# Patient Record
Sex: MALE | Race: WHITE | Employment: OTHER | URBAN - METROPOLITAN AREA
[De-identification: names, ages, dates, MRNs, and addresses within clinical notes are randomized per-mention and may not be internally consistent; named-entity substitution may affect disease eponyms.]

---

## 2017-01-04 ENCOUNTER — OFFICE VISIT (OUTPATIENT)
Dept: INTERNAL MEDICINE CLINIC | Age: 52
End: 2017-01-04

## 2017-01-04 VITALS
WEIGHT: 212 LBS | OXYGEN SATURATION: 98 % | DIASTOLIC BLOOD PRESSURE: 82 MMHG | TEMPERATURE: 98.1 F | SYSTOLIC BLOOD PRESSURE: 140 MMHG | BODY MASS INDEX: 28.71 KG/M2 | HEIGHT: 72 IN | HEART RATE: 78 BPM

## 2017-01-04 DIAGNOSIS — L89.512: ICD-10-CM

## 2017-01-04 DIAGNOSIS — F33.2 SEVERE EPISODE OF RECURRENT MAJOR DEPRESSIVE DISORDER, WITHOUT PSYCHOTIC FEATURES (HCC): Primary | ICD-10-CM

## 2017-01-04 DIAGNOSIS — J06.9 VIRAL URI: ICD-10-CM

## 2017-01-04 PROCEDURE — 99213 OFFICE O/P EST LOW 20 MIN: CPT | Performed by: INTERNAL MEDICINE

## 2017-01-04 RX ORDER — SULFAMETHOXAZOLE AND TRIMETHOPRIM 800; 160 MG/1; MG/1
TABLET ORAL
Qty: 20 TABLET | Refills: 0 | Status: SHIPPED | OUTPATIENT
Start: 2017-01-04 | End: 2017-02-14 | Stop reason: ALTCHOICE

## 2017-01-04 RX ORDER — ALPRAZOLAM 0.25 MG/1
0.25 TABLET ORAL 3 TIMES DAILY PRN
Qty: 10 TABLET | Refills: 0 | Status: SHIPPED | OUTPATIENT
Start: 2017-01-04 | End: 2017-02-03

## 2017-01-04 RX ORDER — VENLAFAXINE HYDROCHLORIDE 37.5 MG/1
37.5 CAPSULE, EXTENDED RELEASE ORAL DAILY
Qty: 30 CAPSULE | Refills: 3 | Status: SHIPPED | OUTPATIENT
Start: 2017-01-04 | End: 2017-02-14 | Stop reason: ALTCHOICE

## 2017-01-04 ASSESSMENT — PATIENT HEALTH QUESTIONNAIRE - PHQ9
SUM OF ALL RESPONSES TO PHQ QUESTIONS 1-9: 2
2. FEELING DOWN, DEPRESSED OR HOPELESS: 1
1. LITTLE INTEREST OR PLEASURE IN DOING THINGS: 1
SUM OF ALL RESPONSES TO PHQ9 QUESTIONS 1 & 2: 2

## 2017-01-26 ENCOUNTER — TELEPHONE (OUTPATIENT)
Dept: INTERNAL MEDICINE CLINIC | Age: 52
End: 2017-01-26

## 2017-02-01 RX ORDER — HYDROCODONE BITARTRATE AND ACETAMINOPHEN 7.5; 325 MG/1; MG/1
TABLET ORAL
Qty: 30 TABLET | Refills: 0 | Status: SHIPPED | OUTPATIENT
Start: 2017-02-01 | End: 2017-07-28 | Stop reason: ALTCHOICE

## 2017-02-14 ENCOUNTER — OFFICE VISIT (OUTPATIENT)
Dept: INTERNAL MEDICINE CLINIC | Age: 52
End: 2017-02-14

## 2017-02-14 VITALS
WEIGHT: 214.8 LBS | RESPIRATION RATE: 16 BRPM | SYSTOLIC BLOOD PRESSURE: 140 MMHG | HEART RATE: 77 BPM | OXYGEN SATURATION: 98 % | BODY MASS INDEX: 29.09 KG/M2 | DIASTOLIC BLOOD PRESSURE: 70 MMHG | TEMPERATURE: 98 F | HEIGHT: 72 IN

## 2017-02-14 DIAGNOSIS — R07.89 BURNING CHEST PAIN: ICD-10-CM

## 2017-02-14 DIAGNOSIS — R07.89 BURNING CHEST PAIN: Primary | ICD-10-CM

## 2017-02-14 LAB
A/G RATIO: 1.8 (ref 1.1–2.2)
ALBUMIN SERPL-MCNC: 4.6 G/DL (ref 3.4–5)
ALP BLD-CCNC: 94 U/L (ref 40–129)
ALT SERPL-CCNC: 19 U/L (ref 10–40)
ANION GAP SERPL CALCULATED.3IONS-SCNC: 16 MMOL/L (ref 3–16)
AST SERPL-CCNC: 18 U/L (ref 15–37)
BILIRUB SERPL-MCNC: 1.2 MG/DL (ref 0–1)
BUN BLDV-MCNC: 10 MG/DL (ref 7–20)
CALCIUM SERPL-MCNC: 10.1 MG/DL (ref 8.3–10.6)
CHLORIDE BLD-SCNC: 98 MMOL/L (ref 99–110)
CO2: 24 MMOL/L (ref 21–32)
CREAT SERPL-MCNC: 0.6 MG/DL (ref 0.9–1.3)
GFR AFRICAN AMERICAN: >60
GFR NON-AFRICAN AMERICAN: >60
GLOBULIN: 2.6 G/DL
GLUCOSE BLD-MCNC: 221 MG/DL (ref 70–99)
POTASSIUM SERPL-SCNC: 4.7 MMOL/L (ref 3.5–5.1)
SODIUM BLD-SCNC: 138 MMOL/L (ref 136–145)
TOTAL PROTEIN: 7.2 G/DL (ref 6.4–8.2)

## 2017-02-14 PROCEDURE — 99213 OFFICE O/P EST LOW 20 MIN: CPT | Performed by: NURSE PRACTITIONER

## 2017-02-14 PROCEDURE — 93000 ELECTROCARDIOGRAM COMPLETE: CPT | Performed by: NURSE PRACTITIONER

## 2017-02-14 RX ORDER — GABAPENTIN 100 MG/1
CAPSULE ORAL
Qty: 90 CAPSULE | Refills: 0 | Status: SHIPPED | OUTPATIENT
Start: 2017-02-14 | End: 2017-12-18

## 2017-02-14 RX ORDER — GABAPENTIN 100 MG/1
100 CAPSULE ORAL NIGHTLY
Qty: 30 CAPSULE | Refills: 0 | Status: SHIPPED | OUTPATIENT
Start: 2017-02-14 | End: 2017-02-14 | Stop reason: SDUPTHER

## 2017-02-14 ASSESSMENT — ENCOUNTER SYMPTOMS
ABDOMINAL PAIN: 0
SHORTNESS OF BREATH: 0
CHEST TIGHTNESS: 0

## 2017-02-27 ENCOUNTER — OFFICE VISIT (OUTPATIENT)
Dept: INTERNAL MEDICINE CLINIC | Age: 52
End: 2017-02-27

## 2017-02-27 VITALS
SYSTOLIC BLOOD PRESSURE: 142 MMHG | BODY MASS INDEX: 28.85 KG/M2 | WEIGHT: 213 LBS | DIASTOLIC BLOOD PRESSURE: 82 MMHG | OXYGEN SATURATION: 98 % | HEIGHT: 72 IN | HEART RATE: 78 BPM

## 2017-02-27 DIAGNOSIS — I10 BENIGN ESSENTIAL HTN: ICD-10-CM

## 2017-02-27 DIAGNOSIS — Z23 NEED FOR PNEUMOCOCCAL VACCINATION: ICD-10-CM

## 2017-02-27 DIAGNOSIS — F17.200 SMOKER: ICD-10-CM

## 2017-02-27 DIAGNOSIS — Z79.4 TYPE 2 DIABETES MELLITUS WITH DIABETIC POLYNEUROPATHY, WITH LONG-TERM CURRENT USE OF INSULIN (HCC): Primary | ICD-10-CM

## 2017-02-27 DIAGNOSIS — E11.9 COMPREHENSIVE DIABETIC FOOT EXAMINATION, TYPE 2 DM, ENCOUNTER FOR (HCC): ICD-10-CM

## 2017-02-27 DIAGNOSIS — E11.42 TYPE 2 DIABETES MELLITUS WITH DIABETIC POLYNEUROPATHY, WITH LONG-TERM CURRENT USE OF INSULIN (HCC): Primary | ICD-10-CM

## 2017-02-27 LAB
CREATININE URINE: 100.9 MG/DL (ref 39–259)
HBA1C MFR BLD: 8.3 %
MICROALBUMIN UR-MCNC: <1.2 MG/DL
MICROALBUMIN/CREAT UR-RTO: NORMAL MG/G (ref 0–30)

## 2017-02-27 PROCEDURE — 99214 OFFICE O/P EST MOD 30 MIN: CPT | Performed by: INTERNAL MEDICINE

## 2017-02-27 PROCEDURE — 83036 HEMOGLOBIN GLYCOSYLATED A1C: CPT | Performed by: INTERNAL MEDICINE

## 2017-02-27 RX ORDER — HYDROCODONE BITARTRATE AND ACETAMINOPHEN 5; 325 MG/1; MG/1
TABLET ORAL
Qty: 30 TABLET | Refills: 0 | Status: SHIPPED | OUTPATIENT
Start: 2017-02-27 | End: 2017-04-28 | Stop reason: SDUPTHER

## 2017-03-10 PROCEDURE — 90670 PCV13 VACCINE IM: CPT | Performed by: INTERNAL MEDICINE

## 2017-03-10 PROCEDURE — 90471 IMMUNIZATION ADMIN: CPT | Performed by: INTERNAL MEDICINE

## 2017-04-28 ENCOUNTER — TELEPHONE (OUTPATIENT)
Dept: INTERNAL MEDICINE CLINIC | Age: 52
End: 2017-04-28

## 2017-04-28 RX ORDER — HYDROCODONE BITARTRATE AND ACETAMINOPHEN 5; 325 MG/1; MG/1
TABLET ORAL
Qty: 30 TABLET | Refills: 0 | Status: SHIPPED | OUTPATIENT
Start: 2017-04-28 | End: 2017-06-21 | Stop reason: SDUPTHER

## 2017-05-09 ENCOUNTER — OFFICE VISIT (OUTPATIENT)
Dept: INTERNAL MEDICINE CLINIC | Age: 52
End: 2017-05-09

## 2017-05-09 VITALS
BODY MASS INDEX: 28.42 KG/M2 | DIASTOLIC BLOOD PRESSURE: 72 MMHG | OXYGEN SATURATION: 97 % | HEART RATE: 77 BPM | SYSTOLIC BLOOD PRESSURE: 128 MMHG | HEIGHT: 72 IN | TEMPERATURE: 98.5 F | WEIGHT: 209.8 LBS

## 2017-05-09 DIAGNOSIS — E11.9 TYPE 2 DIABETES MELLITUS WITHOUT COMPLICATION, WITHOUT LONG-TERM CURRENT USE OF INSULIN (HCC): Primary | ICD-10-CM

## 2017-05-09 DIAGNOSIS — J01.00 ACUTE NON-RECURRENT MAXILLARY SINUSITIS: ICD-10-CM

## 2017-05-09 LAB — HBA1C MFR BLD: 7.8 %

## 2017-05-09 PROCEDURE — 83036 HEMOGLOBIN GLYCOSYLATED A1C: CPT | Performed by: INTERNAL MEDICINE

## 2017-05-09 PROCEDURE — 99213 OFFICE O/P EST LOW 20 MIN: CPT | Performed by: INTERNAL MEDICINE

## 2017-05-09 RX ORDER — AZITHROMYCIN 250 MG/1
TABLET, FILM COATED ORAL
Qty: 1 PACKET | Refills: 0 | Status: SHIPPED | OUTPATIENT
Start: 2017-05-09 | End: 2017-05-19

## 2017-05-09 RX ORDER — ATORVASTATIN CALCIUM 20 MG/1
TABLET, FILM COATED ORAL
Qty: 90 TABLET | Refills: 2 | Status: SHIPPED | OUTPATIENT
Start: 2017-05-09 | End: 2017-10-17 | Stop reason: SDUPTHER

## 2017-05-09 ASSESSMENT — ENCOUNTER SYMPTOMS
SINUS PRESSURE: 1
COUGH: 1

## 2017-06-21 RX ORDER — HYDROCODONE BITARTRATE AND ACETAMINOPHEN 5; 325 MG/1; MG/1
TABLET ORAL
Qty: 30 TABLET | Refills: 0 | Status: SHIPPED | OUTPATIENT
Start: 2017-06-21 | End: 2017-08-09 | Stop reason: SDUPTHER

## 2017-07-27 ENCOUNTER — TELEPHONE (OUTPATIENT)
Dept: INTERNAL MEDICINE CLINIC | Age: 52
End: 2017-07-27

## 2017-07-28 ENCOUNTER — TELEPHONE (OUTPATIENT)
Dept: INTERNAL MEDICINE CLINIC | Age: 52
End: 2017-07-28

## 2017-07-28 ENCOUNTER — OFFICE VISIT (OUTPATIENT)
Dept: INTERNAL MEDICINE CLINIC | Age: 52
End: 2017-07-28

## 2017-07-28 VITALS
WEIGHT: 205 LBS | BODY MASS INDEX: 27.77 KG/M2 | TEMPERATURE: 97.8 F | HEIGHT: 72 IN | OXYGEN SATURATION: 99 % | RESPIRATION RATE: 20 BRPM | HEART RATE: 70 BPM | DIASTOLIC BLOOD PRESSURE: 66 MMHG | SYSTOLIC BLOOD PRESSURE: 128 MMHG

## 2017-07-28 DIAGNOSIS — Z01.818 PRE-OP TESTING: ICD-10-CM

## 2017-07-28 DIAGNOSIS — Z01.818 PRE-OP TESTING: Primary | ICD-10-CM

## 2017-07-28 LAB
ALBUMIN SERPL-MCNC: 4.7 G/DL (ref 3.4–5)
ANION GAP SERPL CALCULATED.3IONS-SCNC: 15 MMOL/L (ref 3–16)
BUN BLDV-MCNC: 12 MG/DL (ref 7–20)
CALCIUM SERPL-MCNC: 10.3 MG/DL (ref 8.3–10.6)
CHLORIDE BLD-SCNC: 100 MMOL/L (ref 99–110)
CO2: 24 MMOL/L (ref 21–32)
CREAT SERPL-MCNC: 0.7 MG/DL (ref 0.9–1.3)
GFR AFRICAN AMERICAN: >60
GFR NON-AFRICAN AMERICAN: >60
GLUCOSE BLD-MCNC: 224 MG/DL (ref 70–99)
HCT VFR BLD CALC: 45.4 % (ref 40.5–52.5)
HEMOGLOBIN: 15.8 G/DL (ref 13.5–17.5)
MCH RBC QN AUTO: 31.2 PG (ref 26–34)
MCHC RBC AUTO-ENTMCNC: 34.8 G/DL (ref 31–36)
MCV RBC AUTO: 89.7 FL (ref 80–100)
PDW BLD-RTO: 13.2 % (ref 12.4–15.4)
PHOSPHORUS: 3 MG/DL (ref 2.5–4.9)
PLATELET # BLD: 210 K/UL (ref 135–450)
PMV BLD AUTO: 9.1 FL (ref 5–10.5)
POTASSIUM SERPL-SCNC: 4.7 MMOL/L (ref 3.5–5.1)
RBC # BLD: 5.06 M/UL (ref 4.2–5.9)
SODIUM BLD-SCNC: 139 MMOL/L (ref 136–145)
WBC # BLD: 8.6 K/UL (ref 4–11)

## 2017-07-28 PROCEDURE — 93000 ELECTROCARDIOGRAM COMPLETE: CPT | Performed by: INTERNAL MEDICINE

## 2017-07-28 PROCEDURE — 99214 OFFICE O/P EST MOD 30 MIN: CPT | Performed by: INTERNAL MEDICINE

## 2017-07-28 ASSESSMENT — ENCOUNTER SYMPTOMS
WHEEZING: 0
SORE THROAT: 0
SHORTNESS OF BREATH: 0
DIARRHEA: 0
NAUSEA: 0
TROUBLE SWALLOWING: 0
ABDOMINAL PAIN: 0
VOMITING: 0

## 2017-08-09 RX ORDER — HYDROCODONE BITARTRATE AND ACETAMINOPHEN 5; 325 MG/1; MG/1
TABLET ORAL
Qty: 30 TABLET | Refills: 0 | Status: SHIPPED | OUTPATIENT
Start: 2017-08-09 | End: 2017-09-07 | Stop reason: SDUPTHER

## 2017-08-18 ENCOUNTER — TELEPHONE (OUTPATIENT)
Dept: INTERNAL MEDICINE CLINIC | Age: 52
End: 2017-08-18

## 2017-08-29 ENCOUNTER — OFFICE VISIT (OUTPATIENT)
Dept: INTERNAL MEDICINE CLINIC | Age: 52
End: 2017-08-29

## 2017-08-29 ENCOUNTER — TELEPHONE (OUTPATIENT)
Dept: INTERNAL MEDICINE CLINIC | Age: 52
End: 2017-08-29

## 2017-08-29 VITALS
DIASTOLIC BLOOD PRESSURE: 72 MMHG | OXYGEN SATURATION: 98 % | HEART RATE: 66 BPM | WEIGHT: 198.6 LBS | BODY MASS INDEX: 26.9 KG/M2 | HEIGHT: 72 IN | TEMPERATURE: 97.5 F | SYSTOLIC BLOOD PRESSURE: 126 MMHG

## 2017-08-29 DIAGNOSIS — E11.9 TYPE 2 DIABETES MELLITUS WITHOUT COMPLICATION, WITH LONG-TERM CURRENT USE OF INSULIN (HCC): ICD-10-CM

## 2017-08-29 DIAGNOSIS — F17.200 SMOKER: ICD-10-CM

## 2017-08-29 DIAGNOSIS — R63.4 WEIGHT LOSS: ICD-10-CM

## 2017-08-29 DIAGNOSIS — Z79.4 TYPE 2 DIABETES MELLITUS WITHOUT COMPLICATION, WITH LONG-TERM CURRENT USE OF INSULIN (HCC): ICD-10-CM

## 2017-08-29 DIAGNOSIS — R31.0 GROSS HEMATURIA: Primary | ICD-10-CM

## 2017-08-29 LAB
GLUCOSE BLD-MCNC: 207 MG/DL
HBA1C MFR BLD: 7.5 %

## 2017-08-29 PROCEDURE — 82962 GLUCOSE BLOOD TEST: CPT | Performed by: INTERNAL MEDICINE

## 2017-08-29 PROCEDURE — 83036 HEMOGLOBIN GLYCOSYLATED A1C: CPT | Performed by: INTERNAL MEDICINE

## 2017-08-29 PROCEDURE — 99214 OFFICE O/P EST MOD 30 MIN: CPT | Performed by: INTERNAL MEDICINE

## 2017-08-29 RX ORDER — SULFAMETHOXAZOLE AND TRIMETHOPRIM 800; 160 MG/1; MG/1
1 TABLET ORAL 2 TIMES DAILY
Qty: 14 TABLET | Refills: 0 | Status: SHIPPED | OUTPATIENT
Start: 2017-08-29 | End: 2017-09-05

## 2017-08-31 LAB — URINE CULTURE, ROUTINE: NORMAL

## 2017-09-01 ENCOUNTER — TELEPHONE (OUTPATIENT)
Dept: INTERNAL MEDICINE CLINIC | Age: 52
End: 2017-09-01

## 2017-09-01 ENCOUNTER — HOSPITAL ENCOUNTER (OUTPATIENT)
Dept: CT IMAGING | Age: 52
Discharge: HOME | End: 2017-09-01
Attending: INTERNAL MEDICINE | Admitting: INTERNAL MEDICINE

## 2017-09-01 DIAGNOSIS — R31.0 GROSS HEMATURIA: ICD-10-CM

## 2017-09-01 DIAGNOSIS — R31.9 HEMATURIA: ICD-10-CM

## 2017-09-01 DIAGNOSIS — R31.9 HEMATURIA: Primary | ICD-10-CM

## 2017-09-08 RX ORDER — HYDROCODONE BITARTRATE AND ACETAMINOPHEN 5; 325 MG/1; MG/1
TABLET ORAL
Qty: 30 TABLET | Refills: 0 | Status: SHIPPED | OUTPATIENT
Start: 2017-09-08 | End: 2017-11-22 | Stop reason: SDUPTHER

## 2017-10-05 ENCOUNTER — HOSPITAL ENCOUNTER (OUTPATIENT)
Dept: OTHER | Age: 52
Discharge: HOME | End: 2017-10-05
Attending: UROLOGY | Admitting: UROLOGY

## 2017-10-05 DIAGNOSIS — N20.0 URIC ACID NEPHROLITHIASIS: ICD-10-CM

## 2017-10-17 ENCOUNTER — OFFICE VISIT (OUTPATIENT)
Dept: INTERNAL MEDICINE CLINIC | Age: 52
End: 2017-10-17

## 2017-10-17 VITALS
HEART RATE: 70 BPM | BODY MASS INDEX: 27.09 KG/M2 | DIASTOLIC BLOOD PRESSURE: 84 MMHG | SYSTOLIC BLOOD PRESSURE: 134 MMHG | HEIGHT: 72 IN | WEIGHT: 200 LBS | TEMPERATURE: 98 F | OXYGEN SATURATION: 98 %

## 2017-10-17 DIAGNOSIS — Z12.11 SCREEN FOR COLON CANCER: ICD-10-CM

## 2017-10-17 DIAGNOSIS — K80.20 GALLSTONES: ICD-10-CM

## 2017-10-17 DIAGNOSIS — F17.200 SMOKER: ICD-10-CM

## 2017-10-17 DIAGNOSIS — I25.10 CORONARY ARTERIOSCLEROSIS: Primary | ICD-10-CM

## 2017-10-17 PROCEDURE — 99214 OFFICE O/P EST MOD 30 MIN: CPT | Performed by: INTERNAL MEDICINE

## 2017-10-17 RX ORDER — ATORVASTATIN CALCIUM 40 MG/1
TABLET, FILM COATED ORAL
Qty: 90 TABLET | Refills: 3 | Status: SHIPPED | OUTPATIENT
Start: 2017-10-17 | End: 2018-11-08 | Stop reason: SDUPTHER

## 2017-10-17 ASSESSMENT — ENCOUNTER SYMPTOMS: NAUSEA: 1

## 2017-10-17 NOTE — PROGRESS NOTES
BP Readings from Last 2 Encounters:   10/17/17 134/84   08/29/17 126/72     Hemoglobin A1C (%)   Date Value   08/29/2017 7.5     MICROALBUMIN, RANDOM URINE (mg/dL)   Date Value   02/27/2017 <1.20     LDL Calculated (mg/dL)   Date Value   09/18/2015 60              Tobacco use:  Patient  reports that he has been smoking Cigarettes. He started smoking about 37 years ago. He has a 20.00 pack-year smoking history. He does not have any smokeless tobacco history on file. If Smoker - Cessation materials given? Yes    Is Daily aspirin on medication list?:  Yes    Diabetic retinal exam done? Yes   If yes, document in Health Maintenance. Monofilament placed on counter? Yes    Shoes and socks removed? Yes    BP taken with correct size cuff? Yes   Repeated if > 130/80 No     Microalbumin performed if applicable?   Yes
person, place, and time and well-developed, well-nourished, and in no distress. No distress. HENT:   Head: Normocephalic and atraumatic. Eyes: Conjunctivae are normal. Pupils are equal, round, and reactive to light. Neck: Normal range of motion. Neck supple. Cardiovascular: Normal rate, regular rhythm and normal heart sounds. Pulmonary/Chest: Effort normal and breath sounds normal.   Abdominal: Soft. Bowel sounds are normal. There is no tenderness. Musculoskeletal: Normal range of motion. He exhibits no edema. Neurological: He is alert and oriented to person, place, and time. Skin: Skin is warm and dry. Psychiatric: Affect and judgment normal.         Assessment/Plan     1. CAD: on ct not clearly symptomatic, multiple risk factors  Plan GXT mc  2. Gallstones:  Not clearly symptomatic  Plan Hida scan  3. DM2  Not well controlled  4.  Smoker   Needs to quit  discussed  Isis Diaz MD

## 2017-10-23 ENCOUNTER — HOSPITAL ENCOUNTER (OUTPATIENT)
Dept: NON INVASIVE DIAGNOSTICS | Age: 52
Discharge: HOME | End: 2017-10-23
Admitting: INTERNAL MEDICINE

## 2017-10-23 DIAGNOSIS — K80.20 CALCULUS OF GALLBLADDER WITHOUT CHOLECYSTITIS WITHOUT OBSTRUCTION: ICD-10-CM

## 2017-10-23 LAB
LV EF: 57 %
LVEF MODALITY: NORMAL

## 2017-10-24 ENCOUNTER — OFFICE VISIT (OUTPATIENT)
Dept: INTERNAL MEDICINE CLINIC | Age: 52
End: 2017-10-24

## 2017-10-24 ENCOUNTER — TELEPHONE (OUTPATIENT)
Dept: INTERNAL MEDICINE CLINIC | Age: 52
End: 2017-10-24

## 2017-10-24 VITALS
TEMPERATURE: 97.8 F | WEIGHT: 203.8 LBS | DIASTOLIC BLOOD PRESSURE: 80 MMHG | OXYGEN SATURATION: 98 % | SYSTOLIC BLOOD PRESSURE: 128 MMHG | HEART RATE: 73 BPM | HEIGHT: 72 IN | BODY MASS INDEX: 27.6 KG/M2

## 2017-10-24 DIAGNOSIS — E11.42 DIABETIC POLYNEUROPATHY ASSOCIATED WITH TYPE 2 DIABETES MELLITUS (HCC): ICD-10-CM

## 2017-10-24 DIAGNOSIS — Z23 NEED FOR PNEUMOCOCCAL VACCINATION: ICD-10-CM

## 2017-10-24 DIAGNOSIS — E11.9 COMPREHENSIVE DIABETIC FOOT EXAMINATION, TYPE 2 DM, ENCOUNTER FOR (HCC): ICD-10-CM

## 2017-10-24 DIAGNOSIS — S90.812A ABRASION OF LEFT FOOT, INITIAL ENCOUNTER: Primary | ICD-10-CM

## 2017-10-24 DIAGNOSIS — I25.10 CORONARY ARTERY ARTERIOSCLEROSIS: ICD-10-CM

## 2017-10-24 PROBLEM — E11.40 DIABETIC NEUROPATHY (HCC): Status: ACTIVE | Noted: 2017-01-01

## 2017-10-24 PROCEDURE — 99213 OFFICE O/P EST LOW 20 MIN: CPT | Performed by: INTERNAL MEDICINE

## 2017-10-24 PROCEDURE — 90471 IMMUNIZATION ADMIN: CPT | Performed by: INTERNAL MEDICINE

## 2017-10-24 PROCEDURE — 90732 PPSV23 VACC 2 YRS+ SUBQ/IM: CPT | Performed by: INTERNAL MEDICINE
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
## 2017-10-24 NOTE — TELEPHONE ENCOUNTER
Patient called back and he was scheduled today at 4pm with Dr. [facility]BLE[facility]. Chales Minus

## 2017-10-26 ENCOUNTER — HOSPITAL ENCOUNTER (OUTPATIENT)
Dept: NUCLEAR MEDICINE | Age: 52
Discharge: HOME | End: 2017-10-26
Attending: INTERNAL MEDICINE | Admitting: INTERNAL MEDICINE

## 2017-10-26 DIAGNOSIS — K80.20 CALCULUS OF GALLBLADDER WITHOUT CHOLECYSTITIS WITHOUT OBSTRUCTION: ICD-10-CM

## 2017-10-26 DIAGNOSIS — K80.20 GALLSTONES: ICD-10-CM

## 2017-10-26 RX ADMIN — Medication 6 MILLICURIE: at 08:45

## 2017-11-09 ENCOUNTER — OFFICE VISIT (OUTPATIENT)
Dept: SURGERY | Age: 52
End: 2017-11-09

## 2017-11-09 VITALS
HEART RATE: 84 BPM | BODY MASS INDEX: 27.5 KG/M2 | WEIGHT: 203 LBS | HEIGHT: 72 IN | DIASTOLIC BLOOD PRESSURE: 91 MMHG | SYSTOLIC BLOOD PRESSURE: 150 MMHG

## 2017-11-09 DIAGNOSIS — D12.4 ADENOMATOUS POLYP OF DESCENDING COLON: Primary | ICD-10-CM

## 2017-11-09 PROCEDURE — 99243 OFF/OP CNSLTJ NEW/EST LOW 30: CPT | Performed by: SURGERY
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
## 2017-11-22 RX ORDER — HYDROCODONE BITARTRATE AND ACETAMINOPHEN 5; 325 MG/1; MG/1
TABLET ORAL
Qty: 30 TABLET | Refills: 0 | Status: SHIPPED | OUTPATIENT
Start: 2017-11-22 | End: 2018-02-02 | Stop reason: SDUPTHER

## 2017-12-05 ENCOUNTER — OFFICE VISIT (OUTPATIENT)
Dept: INTERNAL MEDICINE CLINIC | Age: 52
End: 2017-12-05

## 2017-12-05 VITALS
HEART RATE: 73 BPM | WEIGHT: 202.4 LBS | BODY MASS INDEX: 27.41 KG/M2 | TEMPERATURE: 97.9 F | SYSTOLIC BLOOD PRESSURE: 112 MMHG | HEIGHT: 72 IN | OXYGEN SATURATION: 98 % | DIASTOLIC BLOOD PRESSURE: 72 MMHG

## 2017-12-05 DIAGNOSIS — E78.00 HYPERCHOLESTEROLEMIA: ICD-10-CM

## 2017-12-05 DIAGNOSIS — E11.9 TYPE 2 DIABETES MELLITUS WITHOUT COMPLICATION, WITHOUT LONG-TERM CURRENT USE OF INSULIN (HCC): Primary | ICD-10-CM

## 2017-12-05 PROBLEM — K63.5 COLON POLYPS: Status: ACTIVE | Noted: 2017-01-01

## 2017-12-05 LAB
A/G RATIO: 1.8 (ref 1.1–2.2)
ALBUMIN SERPL-MCNC: 4.6 G/DL (ref 3.4–5)
ALP BLD-CCNC: 99 U/L (ref 40–129)
ALT SERPL-CCNC: 19 U/L (ref 10–40)
ANION GAP SERPL CALCULATED.3IONS-SCNC: 11 MMOL/L (ref 3–16)
AST SERPL-CCNC: 16 U/L (ref 15–37)
BASOPHILS ABSOLUTE: 0 K/UL (ref 0–0.2)
BASOPHILS RELATIVE PERCENT: 0.5 %
BILIRUB SERPL-MCNC: 1.6 MG/DL (ref 0–1)
BUN BLDV-MCNC: 11 MG/DL (ref 7–20)
CALCIUM SERPL-MCNC: 10 MG/DL (ref 8.3–10.6)
CHLORIDE BLD-SCNC: 99 MMOL/L (ref 99–110)
CHOLESTEROL, TOTAL: 106 MG/DL (ref 0–199)
CO2: 27 MMOL/L (ref 21–32)
CREAT SERPL-MCNC: 0.6 MG/DL (ref 0.9–1.3)
EOSINOPHILS ABSOLUTE: 0.3 K/UL (ref 0–0.6)
EOSINOPHILS RELATIVE PERCENT: 3.3 %
GFR AFRICAN AMERICAN: >60
GFR NON-AFRICAN AMERICAN: >60
GLOBULIN: 2.5 G/DL
GLUCOSE BLD-MCNC: 206 MG/DL (ref 70–99)
HCT VFR BLD CALC: 45 % (ref 40.5–52.5)
HDLC SERPL-MCNC: 50 MG/DL (ref 40–60)
HEMOGLOBIN: 15.3 G/DL (ref 13.5–17.5)
LDL CHOLESTEROL CALCULATED: 37 MG/DL
LYMPHOCYTES ABSOLUTE: 2.1 K/UL (ref 1–5.1)
LYMPHOCYTES RELATIVE PERCENT: 24.4 %
MCH RBC QN AUTO: 31.1 PG (ref 26–34)
MCHC RBC AUTO-ENTMCNC: 33.9 G/DL (ref 31–36)
MCV RBC AUTO: 91.8 FL (ref 80–100)
MONOCYTES ABSOLUTE: 0.6 K/UL (ref 0–1.3)
MONOCYTES RELATIVE PERCENT: 6.9 %
NEUTROPHILS ABSOLUTE: 5.5 K/UL (ref 1.7–7.7)
NEUTROPHILS RELATIVE PERCENT: 64.9 %
PDW BLD-RTO: 14.4 % (ref 12.4–15.4)
PLATELET # BLD: 199 K/UL (ref 135–450)
PMV BLD AUTO: 9.6 FL (ref 5–10.5)
POTASSIUM SERPL-SCNC: 4.7 MMOL/L (ref 3.5–5.1)
RBC # BLD: 4.9 M/UL (ref 4.2–5.9)
SODIUM BLD-SCNC: 137 MMOL/L (ref 136–145)
TOTAL PROTEIN: 7.1 G/DL (ref 6.4–8.2)
TRIGL SERPL-MCNC: 95 MG/DL (ref 0–150)
TSH SERPL DL<=0.05 MIU/L-ACNC: 1.26 UIU/ML (ref 0.27–4.2)
VLDLC SERPL CALC-MCNC: 19 MG/DL
WBC # BLD: 8.5 K/UL (ref 4–11)

## 2017-12-05 PROCEDURE — 99213 OFFICE O/P EST LOW 20 MIN: CPT | Performed by: INTERNAL MEDICINE

## 2017-12-05 PROCEDURE — 83036 HEMOGLOBIN GLYCOSYLATED A1C: CPT | Performed by: INTERNAL MEDICINE

## 2017-12-05 RX ORDER — HYDROCODONE BITARTRATE AND ACETAMINOPHEN 5; 325 MG/1; MG/1
TABLET ORAL
Qty: 30 TABLET | Refills: 0 | Status: CANCELLED | OUTPATIENT
Start: 2017-12-05
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
## 2017-12-05 NOTE — PROGRESS NOTES
BP Readings from Last 2 Encounters:   12/05/17 112/72   11/09/17 (!) 150/91     Hemoglobin A1C (%)   Date Value   08/29/2017 7.5     MICROALBUMIN, RANDOM URINE (mg/dL)   Date Value   02/27/2017 <1.20     LDL Calculated (mg/dL)   Date Value   09/18/2015 60              Tobacco use:  Patient  reports that he has been smoking Cigarettes. He started smoking about 37 years ago. He has a 20.00 pack-year smoking history. He has never used smokeless tobacco.    If Smoker - Cessation materials given? Yes    Is Daily aspirin on medication list?:  Yes    Diabetic retinal exam done? Yes   If yes, document in Health Maintenance. Monofilament placed on counter? Yes    Shoes and socks removed? Yes    BP taken with correct size cuff? Yes   Repeated if > 130/80 No     Microalbumin performed if applicable?   Yes

## 2017-12-15 ENCOUNTER — PAT TELEPHONE (OUTPATIENT)
Dept: PREADMISSION TESTING | Age: 52
End: 2017-12-15

## 2017-12-15 VITALS — HEIGHT: 72 IN | BODY MASS INDEX: 27.09 KG/M2 | WEIGHT: 200 LBS

## 2017-12-15 RX ORDER — AMPICILLIN TRIHYDRATE 250 MG
1 CAPSULE ORAL NIGHTLY
COMMUNITY

## 2017-12-15 ASSESSMENT — PAIN DESCRIPTION - LOCATION: LOCATION: BACK;HIP

## 2017-12-15 ASSESSMENT — PAIN DESCRIPTION - ORIENTATION: ORIENTATION: RIGHT

## 2017-12-15 ASSESSMENT — PAIN - FUNCTIONAL ASSESSMENT: PAIN_FUNCTIONAL_ASSESSMENT: 0-10

## 2017-12-15 ASSESSMENT — PAIN SCALES - GENERAL: PAINLEVEL_OUTOF10: 2

## 2017-12-15 ASSESSMENT — PAIN DESCRIPTION - DIRECTION: RADIATING_TOWARDS: LOWER BACK

## 2017-12-15 NOTE — PRE-PROCEDURE INSTRUCTIONS
[facility] time__0600__________        Surgery time__0730__________    Take the following medications with a sip of water: hydrocodone if needed    Do not eat or drink anything after 12:00 midnight prior to your surgery. This includes water chewing gum, mints and ice chips. You may brush your teeth and gargle the morning of your surgery, but do not swallow the water     Please see your family doctor/pediatrician for a history and physical and/or concerning medications. Bring any test results/reports from your physicians office. If you are under the care of a heart doctor or specialist doctor, please be aware that you may be asked to them for clearance    You may be asked to stop blood thinners such as Coumadin, Plavix, Fragmin, Lovenox, etc., or any anti-inflammatories such as:  Aspirin, Ibuprofen, Advil, Naproxen prior to your surgery. We also ask that you stop any OTC medications such as fish oil, vitamin E, glucosamine, garlic, Multivitamins, COQ 10, etc.may take tylenol-stop cinnamon,     We ask that you do not smoke 24 hours prior to surgery  We ask that you do not  drink any alcoholic beverages 24 hours prior to surgery     You must make arrangements for a responsible adult to take you home after your surgery. For your safety you will not be allowed to leave alone or drive yourself home. Your surgery will be cancelled if you do not have a ride home. Also for your safety, it is strongly suggested that someone stay with you the first 24 hours after your surgery. A parent or legal guardian must accompany a child scheduled for surgery and plan to stay at the hospital until the child is discharged. Please do not bring other children with you. For your comfort, please wear simple loose fitting clothing to the hospital.  Please do not bring valuables.     Do not wear any make-up or nail polish on your fingers or toes      For your safety, please do not wear any jewelry or body piercing's on the day of surgery. All jewelry must be removed. If you have dentures, they will be removed before going to operating room. For your convenience, we will provide you with a container. If you wear contact lenses or glasses, they will be removed, please bring a case for them. If you have a living will and a durable power of  for healthcare, please bring in a copy. As part of our patient safety program to minimize surgical site infections, we ask you to do the following:    · Please notify your surgeon if you develop any illness between         now and the  day of your surgery. · This includes a cough, cold, fever, sore throat, nausea,         or vomiting, and diarrhea, etc.  ·  Please notify your surgeon if you experience dizziness, shortness         of breath or blurred vision between now and the time of your surgery. Do not shave your operative site 96 hours prior to surgery. For face and neck surgery, men may use an electric razor 48 hours   prior to surgery. You may shower the night before surgery or the morning of   your surgery with an antibacterial soap. You will need to bring a photo ID and insurance card    [facility] has an onsite pharmacy, would you like to utilize our pharmacy     If you will be staying overnight and use a C-pap machine, please bring   your C-pap to hospital     Our goal is to provide you with excellent care, therefore, visitors will be limited to two(2) in the room at a time so that we may focus on providing this care for you. Please contact pre-admission testing if you have any further questions. [facility] phone number:  [phone] PAT fax number:  [phone]  Please note these are generalized instructions for all surgical cases, you may be provided with more specific instructions according to your surgery.

## 2017-12-15 NOTE — PRE-PROCEDURE INSTRUCTIONS
C-Difficile admission screening and protocol:     * Admitted with diarrhea?no     *Prior history of C-Diff. In last 3 months? no     *Antibiotic use in the past 6-8 weeks? no     *Prior hospitalization or nursing home in the last month?  no

## 2017-12-18 ENCOUNTER — HOSPITAL ENCOUNTER (OUTPATIENT)
Dept: ENDOSCOPY | Age: 52
Discharge: HOME | End: 2017-12-18
Attending: INTERNAL MEDICINE | Admitting: INTERNAL MEDICINE

## 2017-12-18 VITALS
HEART RATE: 61 BPM | SYSTOLIC BLOOD PRESSURE: 127 MMHG | RESPIRATION RATE: 16 BRPM | TEMPERATURE: 96.9 F | DIASTOLIC BLOOD PRESSURE: 70 MMHG | OXYGEN SATURATION: 97 %

## 2017-12-18 LAB
GLUCOSE BLD-MCNC: 100 MG/DL (ref 70–99)
GLUCOSE BLD-MCNC: 129 MG/DL (ref 70–99)
PERFORMED ON: ABNORMAL
PERFORMED ON: ABNORMAL

## 2017-12-18 RX ORDER — ONDANSETRON 2 MG/ML
4 INJECTION INTRAMUSCULAR; INTRAVENOUS
Status: ACTIVE | OUTPATIENT
Start: 2017-12-18 | End: 2017-12-18

## 2017-12-18 RX ORDER — SODIUM CHLORIDE 0.9 % (FLUSH) 0.9 %
10 SYRINGE (ML) INJECTION EVERY 12 HOURS SCHEDULED
Status: DISCONTINUED | OUTPATIENT
Start: 2017-12-18 | End: 2017-12-19 | Stop reason: HOSPADM

## 2017-12-18 RX ORDER — SODIUM CHLORIDE 0.9 % (FLUSH) 0.9 %
10 SYRINGE (ML) INJECTION PRN
Status: DISCONTINUED | OUTPATIENT
Start: 2017-12-18 | End: 2017-12-19 | Stop reason: HOSPADM

## 2017-12-18 RX ORDER — LABETALOL HYDROCHLORIDE 5 MG/ML
5 INJECTION, SOLUTION INTRAVENOUS EVERY 10 MIN PRN
Status: DISCONTINUED | OUTPATIENT
Start: 2017-12-18 | End: 2017-12-19 | Stop reason: HOSPADM

## 2017-12-18 RX ORDER — PROMETHAZINE HYDROCHLORIDE 25 MG/ML
6.25 INJECTION, SOLUTION INTRAMUSCULAR; INTRAVENOUS
Status: ACTIVE | OUTPATIENT
Start: 2017-12-18 | End: 2017-12-18

## 2017-12-18 RX ORDER — SODIUM CHLORIDE 9 MG/ML
INJECTION, SOLUTION INTRAVENOUS CONTINUOUS
Status: DISCONTINUED | OUTPATIENT
Start: 2017-12-18 | End: 2017-12-19 | Stop reason: HOSPADM

## 2017-12-18 RX ADMIN — SODIUM CHLORIDE: 9 INJECTION, SOLUTION INTRAVENOUS at 06:48

## 2017-12-18 ASSESSMENT — PAIN SCALES - GENERAL
PAINLEVEL_OUTOF10: 0
PAINLEVEL_OUTOF10: 0

## 2017-12-18 ASSESSMENT — PAIN - FUNCTIONAL ASSESSMENT: PAIN_FUNCTIONAL_ASSESSMENT: 0-10

## 2017-12-18 NOTE — OP NOTE
Colonoscopy Procedure Note      Patient: Clinton Staples  : 1965  Acct#:     Procedure: Colonoscopy with EMR    Date:  2017    Surgeon:  Emma Holloway MD    Referring Physician:  Minna Austin MD    Previous Colonoscopy: YES  Date: 10/51-[id number]-gt for removal of large polyp   Greater than 3 years: NO    Preoperative Diagnosis: 1. Follow-up for removal of large polyp    Postoperative Diagnosis:  1. Ascending Colon Polyps 2. Descending Colon Polyp-S/p EMR 3. Sigmoid Colon Polyp     Consent:  The patient or their legal guardian has signed a consent, and is aware of the potential risks, benefits, alternatives, and potential complications of this procedure. These include, but are not limited to hemorrhage, bleeding, post procedural pain, perforation, phlebitis, aspiration, hypotension, hypoxia, cardiovascular events such as arryhthmia, and possibly death. Additionally, the possibility of missed colonic polyps and interval colon cancer was discussed in the consent. Anesthesia:  The patient was administered IV propofol per anesthesiology team.  Please see their operative records for full details. Procedure: An informed consent was obtained from the patient after explanation of indications, benefits, possible risks and complications of the procedure. The patient was then taken to the endoscopy suite, placed in the left lateral decubitus position, and the above IV anesthesia was administered. A digital rectal examination was performed and revealed negative without mass, lesions or tenderness. The Olympus video colonoscope was placed in the patient's rectum under digital direction and advanced to the cecum. The cecum was identified by characteristic anatomy and ballottment. The prep was excellent. The ileocecal valve was identified. The scope was then withdrawn back through the cecum, ascending, transverse, descending, sigmoid colon, and rectum.   Careful circumferential examination of the mucosa in these areas demonstrated:    1. A 8 mm polyp in the ascending colon was removed completely with snare cautery polypectomy. Defect closed with hemoclip. 2. A 5 mm polyp in the ascending colon was removed completely with snare cautery polypectomy. 3. A 25 mm sessile polyp in the descending colon at 50 cm between the two previously placed tattoos was lifted with 3 cc of Elaview. The polyp was removed in piecemeal fashion in 5-6 pieces with a 15 mm stiff snare. The edges were treated with soft coag. The defect was closed with 3 hemoclips to reduce risk of post-polypectomy bleeding. 4. A 10 mm sessile polyp was seen just proximal to large polyp not removed due to concern it could interfere with large polypectomy. 5. A 5 mm polyp in the sigmoid colon was removed completely with snare cautery polypectomy. The scope was then withdrawn into the rectum and retroflexed. The retroflexed view of the anal verge and rectum demonstrates normal rectum  The scope was straightened, the colon was decompressed and the scope was withdrawn from the patient. The patient tolerated the procedure well and was taken to the PACU in good condition. Estimated blood loss: 5 CC     Impression:  See post-procedure diagnoses. Recommendations:  1. Await pathology results. 2. Recommend repeat Colonoscopy in 3 months to ensure complete removal.   3. Instructed patient to contact me immediately if any evidence of post-polypectomy bleeding. 4. The patient had biopsies taken today. The patient should call for results in 7 days if they have not heard from our office. Our number is [phone].     KISHAN Curran [address]  12/18/2017  [phone]

## 2017-12-18 NOTE — PROGRESS NOTES
Discharge instructions given to pt and sister. Both express an understanding of instructions. Sister went for car.

## 2017-12-18 NOTE — ANESTHESIA POST-OP
[facility] Department of Anesthesiology  Post-Anesthesia Note       Name:  Yolande Fischer                                  Age:  46 y.o. MRN:  [id number]     Last Vitals & Oxygen Saturation: /70   Pulse 61   Temp 96.9 °F (36.1 °C) (Temporal)   Resp 16   SpO2 97%   No data found. Level of consciousness:  Awake, alert    Respiratory: Respirations easy, no distress. Stable. Cardiovascular: Hemodynamically stable. Hydration: Adequate. PONV: Adequately managed. Post-op pain: Adequately controlled. Post-op assessment: Tolerated anesthetic well without complication. Complications:  None.     Jaydon Montes MD  December 18, 2017   2:16 PM

## 2017-12-18 NOTE — ANESTHESIA PRE-OP
SURGERY      KNEE CARTILAGE SURGERY Right     MOUTH SURGERY      \"wart\" removed post pharynx    SHOULDER ARTHROSCOPY Left 5/2015    rct tear repair    SHOULDER SURGERY  2014    broken up under anesthesia    TONSILLECTOMY AND ADENOIDECTOMY      URETER STENT PLACEMENT Right 2017    right hydronephrosis right upj stone, two stents placed    UVULECTOMY       Social History   Substance Use Topics    Smoking status: Current Every Day Smoker     Packs/day: 0.50     Years: 20.00     Types: Cigarettes     Start date: 5/5/1980     Last attempt to quit: 4/25/2004    Smokeless tobacco: Never Used      Comment: quit 12 years then restarte 2016    Alcohol use Yes      Comment: quit but now has a drink rarely     Medications  Current Outpatient Prescriptions on File Prior to Encounter   Medication Sig Dispense Refill    Cinnamon 500 MG CAPS 1 capsule by Other route daily       GLUCOSAMINE HCL PO Take 1 capsule by mouth daily       Multiple Vitamins-Minerals (MULTIVITAMIN ADULT PO) Take 1 tablet by mouth daily       HYDROcodone-acetaminophen (NORCO) 5-325 MG per tablet Half at night.  30 tablet 0    atorvastatin (LIPITOR) 40 MG tablet TAKE 1 TABLET BY MOUTH DAILY (Patient taking differently: Take 40 mg by mouth nightly TAKE 1 TABLET BY MOUTH DAILY) 90 tablet 3    insulin glargine (BASAGLAR KWIKPEN) 100 UNIT/ML injection pen Inject 40 Units into the skin nightly 5 Pen 5    Insulin Pen Needle (KROGER PEN NEEDLES) 31G X 6 MM MISC 1 each by Does not apply route daily 100 each 3    L-methylfolate-B6-B12 (METANX) 8-08.297-2-55 MG CAPS capsule TAKE ONE CAPSULE BY MOUTH TWO TIMES DAILY FOR DIABETIC NEUROPATHY 180 capsule 3    metFORMIN (GLUCOPHAGE) 1000 MG tablet TAKE 1 TABLET BY MOUTH TWICE DAILY 180 tablet 3    glucose blood VI test strips (KRYSTAL CONTOUR TEST) strip USE AS DIRECTED AS NEEDED 150 each 6    sildenafil (VIAGRA) 100 MG tablet Take 1 tablet by mouth as needed for Erectile Dysfunction 10 tablet 3    Insulin Pen Needle (MEIJER PEN NEEDLES) 31G X 6 MM MISC 1 each by Does not apply route daily. 100 each 3    aspirin 81 MG EC tablet Take 1 tablet by mouth daily. 30 tablet 3     No current facility-administered medications on file prior to encounter. Current Outpatient Prescriptions   Medication Sig Dispense Refill    Cinnamon 500 MG CAPS 1 capsule by Other route daily       GLUCOSAMINE HCL PO Take 1 capsule by mouth daily       Multiple Vitamins-Minerals (MULTIVITAMIN ADULT PO) Take 1 tablet by mouth daily       HYDROcodone-acetaminophen (NORCO) 5-325 MG per tablet Half at night. 30 tablet 0    atorvastatin (LIPITOR) 40 MG tablet TAKE 1 TABLET BY MOUTH DAILY (Patient taking differently: Take 40 mg by mouth nightly TAKE 1 TABLET BY MOUTH DAILY) 90 tablet 3    insulin glargine (BASAGLAR KWIKPEN) 100 UNIT/ML injection pen Inject 40 Units into the skin nightly 5 Pen 5    Insulin Pen Needle (KROGER PEN NEEDLES) 31G X 6 MM MISC 1 each by Does not apply route daily 100 each 3    L-methylfolate-B6-B12 (METANX) 6-49.772-7-04 MG CAPS capsule TAKE ONE CAPSULE BY MOUTH TWO TIMES DAILY FOR DIABETIC NEUROPATHY 180 capsule 3    metFORMIN (GLUCOPHAGE) 1000 MG tablet TAKE 1 TABLET BY MOUTH TWICE DAILY 180 tablet 3    glucose blood VI test strips (KRYSTAL CONTOUR TEST) strip USE AS DIRECTED AS NEEDED 150 each 6    sildenafil (VIAGRA) 100 MG tablet Take 1 tablet by mouth as needed for Erectile Dysfunction 10 tablet 3    Insulin Pen Needle (MEIJER PEN NEEDLES) 31G X 6 MM MISC 1 each by Does not apply route daily. 100 each 3    aspirin 81 MG EC tablet Take 1 tablet by mouth daily.  30 tablet 3     Current Facility-Administered Medications   Medication Dose Route Frequency Provider Last Rate Last Dose    0.9 % sodium chloride infusion   Intravenous Continuous Nahed Torres  mL/hr at 12/18/17 0648      sodium chloride flush 0.9 % injection 10 mL  10 mL Intravenous 2 times per day Nahed Torres MD        sodium 12/05/2017    BUN 11 12/05/2017    CREATININE 0.6 12/05/2017    CALCIUM 10.0 12/05/2017    GFRAA >60 12/05/2017    GFRAA >60 06/12/2013    LABGLOM >60 12/05/2017    LABGLOM 90.8 06/16/2011    GLUCOSE 206 12/05/2017    GLUCOSE 262 06/16/2011     POCGlucose  No results for input(s): GLUCOSE in the last 72 hours. Coags  No results found for: PROTIME, INR, APTT  HCG (If Applicable) No results found for: PREGTESTUR, PREGSERUM, HCG, HCGQUANT   ABGs No results found for: PHART, PO2ART, OTF0AZF, QAM8SAB, BEART, R3VTNJHP   Type & Screen (If Applicable)  No results found for: LABABO, LABRH                         BMI: Wt Readings from Last 3 Encounters:       NPO Status:   Date of last liquid consumption: 12/18/17   Time of last liquid consumption: 0200   Date of last solid food consumption: 12/16/17              Anesthesia Evaluation  Patient summary reviewed no history of anesthetic complications:   Airway: Mallampati: III  TM distance: >3 FB   Neck ROM: full   Dental:          Pulmonary:normal exam    (+) sleep apnea:                             Cardiovascular:  Exercise tolerance: good (>4 METS),   (+) hypertension:, CAD:,       ECG reviewed  Rhythm: regular  Rate: normal           Beta Blocker:  Not on Beta Blocker         Neuro/Psych:   Negative Neuro/Psych ROS              GI/Hepatic/Renal:   (+) bowel prep,           Endo/Other:    (+) Type II DM, using insulin, blood dyscrasia: Factor V:., .                 Abdominal:           Vascular: negative vascular ROS. Anesthesia Plan      MAC     ASA 3       Induction: intravenous. Anesthetic plan and risks discussed with patient. Plan discussed with CRNA. This pre-anesthesia assessment may be used as a history and physical.    DOS STAFF ADDENDUM:    Pt seen and examined, chart reviewed (including anesthesia, drug and allergy history). No interval changes to history and physical examination.   Anesthetic plan, risks, benefits, alternatives, and personnel involved discussed with patient. Patient verbalized an understanding and agrees to proceed.       Zoey Quach MD  December 18, 2017  6:55 AM

## 2017-12-18 NOTE — H&P
(LIPITOR) 40 MG tablet TAKE 1 TABLET BY MOUTH DAILY (Patient taking differently: Take 40 mg by mouth nightly TAKE 1 TABLET BY MOUTH DAILY) 90 tablet 3    insulin glargine (BASAGLAR KWIKPEN) 100 UNIT/ML injection pen Inject 40 Units into the skin nightly 5 Pen 5    Insulin Pen Needle (KROGER PEN NEEDLES) 31G X 6 MM MISC 1 each by Does not apply route daily 100 each 3    L-methylfolate-B6-B12 (METANX) 6-55.351-3-68 MG CAPS capsule TAKE ONE CAPSULE BY MOUTH TWO TIMES DAILY FOR DIABETIC NEUROPATHY 180 capsule 3    metFORMIN (GLUCOPHAGE) 1000 MG tablet TAKE 1 TABLET BY MOUTH TWICE DAILY 180 tablet 3    glucose blood VI test strips (KRYSTAL CONTOUR TEST) strip USE AS DIRECTED AS NEEDED 150 each 6    sildenafil (VIAGRA) 100 MG tablet Take 1 tablet by mouth as needed for Erectile Dysfunction 10 tablet 3    Insulin Pen Needle (MEIJER PEN NEEDLES) 31G X 6 MM MISC 1 each by Does not apply route daily. 100 each 3    aspirin 81 MG EC tablet Take 1 tablet by mouth daily. 30 tablet 3     No current facility-administered medications on file prior to encounter. Allergies:  Effexor xr [venlafaxine hcl er]; Wyvonne Mingle; Lorazepam; and Penicillins    Social History:   TOBACCO:   reports that he has been smoking Cigarettes. He started smoking about 37 years ago. He has a 10.00 pack-year smoking history. He has never used smokeless tobacco.  ETOH:   reports that he drinks alcohol. DRUGS:   reports that he does not use drugs. PHYSICAL EXAM:      Vital Signs: /74   Pulse 67   Temp 96.8 °F (36 °C) (Temporal)   Resp 18   SpO2 98%    Airway: No stridor or wheezing noted. Good air movement  Pulmonary: without wheezes.   Clear to auscultation  Cardiac:regular rate and rhythm without loud murmurs  Abdomen:soft, nontender,  Bowel sounds present    Pre-Procedure Assessment / Plan:  1) Colonoscopy    ASA Grade:  ASA 3 - Patient with moderate systemic disease with functional limitations  Mallampati Classification:  Class III    Level of Sedation Plan:Deep sedation    Post Procedure plan: Return to same level of care    I assessed the patient and find that the patient is in satisfactory condition to proceed with the planned procedure and sedation plan. I have explained the risk, benefits, and alternatives to the procedure; the patient understands and agrees to proceed.        Randy Tompkins MD  12/18/2017

## 2018-02-02 DIAGNOSIS — G89.29 CHRONIC MIDLINE LOW BACK PAIN WITHOUT SCIATICA: Primary | ICD-10-CM

## 2018-02-02 DIAGNOSIS — M54.50 CHRONIC MIDLINE LOW BACK PAIN WITHOUT SCIATICA: Primary | ICD-10-CM

## 2018-02-05 RX ORDER — HYDROCODONE BITARTRATE AND ACETAMINOPHEN 5; 325 MG/1; MG/1
TABLET ORAL
Qty: 30 TABLET | Refills: 0 | Status: SHIPPED | OUTPATIENT
Start: 2018-02-05 | End: 2018-05-16 | Stop reason: SDUPTHER

## 2018-02-12 ENCOUNTER — OFFICE VISIT (OUTPATIENT)
Dept: INTERNAL MEDICINE CLINIC | Age: 53
End: 2018-02-12

## 2018-02-12 VITALS
OXYGEN SATURATION: 98 % | DIASTOLIC BLOOD PRESSURE: 80 MMHG | SYSTOLIC BLOOD PRESSURE: 138 MMHG | WEIGHT: 208 LBS | TEMPERATURE: 98.3 F | HEART RATE: 85 BPM | HEIGHT: 72 IN | BODY MASS INDEX: 28.17 KG/M2

## 2018-02-12 DIAGNOSIS — E11.9 TYPE 2 DIABETES MELLITUS WITHOUT COMPLICATION, WITHOUT LONG-TERM CURRENT USE OF INSULIN (HCC): ICD-10-CM

## 2018-02-12 DIAGNOSIS — E11.9 TYPE 2 DIABETES MELLITUS WITHOUT COMPLICATION, WITHOUT LONG-TERM CURRENT USE OF INSULIN (HCC): Primary | ICD-10-CM

## 2018-02-12 LAB
ANION GAP SERPL CALCULATED.3IONS-SCNC: 13 MMOL/L (ref 3–16)
BUN BLDV-MCNC: 13 MG/DL (ref 7–20)
CALCIUM SERPL-MCNC: 10.5 MG/DL (ref 8.3–10.6)
CHLORIDE BLD-SCNC: 100 MMOL/L (ref 99–110)
CO2: 27 MMOL/L (ref 21–32)
CREAT SERPL-MCNC: 0.8 MG/DL (ref 0.9–1.3)
GFR AFRICAN AMERICAN: >60
GFR NON-AFRICAN AMERICAN: >60
GLUCOSE BLD-MCNC: 216 MG/DL (ref 70–99)
POTASSIUM SERPL-SCNC: 5.3 MMOL/L (ref 3.5–5.1)
SODIUM BLD-SCNC: 140 MMOL/L (ref 136–145)

## 2018-02-12 PROCEDURE — G0444 DEPRESSION SCREEN ANNUAL: HCPCS | Performed by: NURSE PRACTITIONER

## 2018-02-12 PROCEDURE — 99214 OFFICE O/P EST MOD 30 MIN: CPT | Performed by: NURSE PRACTITIONER

## 2018-02-12 ASSESSMENT — PATIENT HEALTH QUESTIONNAIRE - PHQ9
SUM OF ALL RESPONSES TO PHQ QUESTIONS 1-9: 10
SUM OF ALL RESPONSES TO PHQ9 QUESTIONS 1 & 2: 6
5. POOR APPETITE OR OVEREATING: 0
9. THOUGHTS THAT YOU WOULD BE BETTER OFF DEAD, OR OF HURTING YOURSELF: 0
6. FEELING BAD ABOUT YOURSELF - OR THAT YOU ARE A FAILURE OR HAVE LET YOURSELF OR YOUR FAMILY DOWN: 0
1. LITTLE INTEREST OR PLEASURE IN DOING THINGS: 3
7. TROUBLE CONCENTRATING ON THINGS, SUCH AS READING THE NEWSPAPER OR WATCHING TELEVISION: 1
3. TROUBLE FALLING OR STAYING ASLEEP: 0
4. FEELING TIRED OR HAVING LITTLE ENERGY: 3
8. MOVING OR SPEAKING SO SLOWLY THAT OTHER PEOPLE COULD HAVE NOTICED. OR THE OPPOSITE, BEING SO FIGETY OR RESTLESS THAT YOU HAVE BEEN MOVING AROUND A LOT MORE THAN USUAL: 0
10. IF YOU CHECKED OFF ANY PROBLEMS, HOW DIFFICULT HAVE THESE PROBLEMS MADE IT FOR YOU TO DO YOUR WORK, TAKE CARE OF THINGS AT HOME, OR GET ALONG WITH OTHER PEOPLE: 2
2. FEELING DOWN, DEPRESSED OR HOPELESS: 3

## 2018-02-12 ASSESSMENT — ENCOUNTER SYMPTOMS
BACK PAIN: 0
SHORTNESS OF BREATH: 0
ABDOMINAL PAIN: 0
VOMITING: 0
NAUSEA: 0

## 2018-02-12 NOTE — PATIENT INSTRUCTIONS
of everyone who now breathes in your smoke. · Their heart, lung, and cancer risks drop, much like yours. · They are sick less. For babies and small children, living smoke-free means they're less likely to have ear infections, pneumonia, and bronchitis. · If you're a woman who is or will be pregnant someday, quitting smoking means a healthier . · Children who are close to you are less likely to become adult smokers. Where can you learn more? Go to https://[employer]ong [email or web address]. org and sign in to your "[employer] account. Enter [id number] in the Kitchon box to learn more about \"Learning About Benefits From Quitting Smoking. \"     If you do not have an account, please click on the \"Sign Up Now\" link. Current as of: 2017  Content Version: 11.3  © 0325-7105 [employer]. Care instructions adapted under license by [facility] ([facility] [place]). If you have questions about a medical condition or this instruction, always ask your healthcare professional. [place] [address] any warranty or liability for your use of this information. Patient Education        Noninsulin Medicines for Type 2 Diabetes: Care Instructions  Your Care Instructions    There are different types of noninsulin medicines for diabetes. Each works in a different way. But they all help you control your blood sugar. Some types help your body make insulin to lower your blood sugar. Others lower how much insulin your body needs. Some can slow how fast your body digests sugars. And some can remove extra glucose through your urine. · Alpha-glucosidase inhibitors. These keep starches from breaking down. This means that they lower the amount of glucose absorbed when you eat. They don't help your body make more insulin. So they will not cause low blood sugar unless you use them with other medicines for diabetes. They include acarbose and miglitol. · DPP-4 inhibitors.  These help your body raise the level of over-the-counter medicines, vitamins, herbal products, or supplements without talking to your doctor first. Some medicines for type 2 diabetes can cause problems with other medicines or supplements. · Tell your doctor if you plan to get pregnant. Some of these drugs are not safe for pregnant women. · Be safe with medicines. Take your medicines exactly as prescribed. Meglitinides and sulfonylureas can cause your blood sugar to drop very low. Call your doctor if you think you are having a problem with your medicine. · Check your blood sugar often. You can use a glucose monitor. Keeping track can help you know how certain foods, activities, and medicines affect your blood sugar. And it can help you keep your blood sugar from getting so low that it's not safe. When should you call for help? Call 911 anytime you think you may need emergency care. For example, call if:  ? · You passed out (lost consciousness). ? · You are confused or cannot think clearly. ? · Your blood sugar is very high or very low. ? Watch closely for changes in your health, and be sure to contact your doctor if:  ? · Your blood sugar stays outside the level your doctor set for you. ? · You have any problems. Where can you learn more? Go to https://Porchpe[employer]Handshake.[employer]. org and sign in to your [employer] account. Enter H153 in the Magic Leap box to learn more about \"Noninsulin Medicines for Type 2 Diabetes: Care Instructions. \"     If you do not have an account, please click on the \"Sign Up Now\" link. Current as of: March 13, 2017  Content Version: 11.5  © 8420-4778 [employer]orporated. Care instructions adapted under license by [facility] ([facility]). If you have questions about a medical condition or this instruction, always ask your healthcare professional. [place] [address] any warranty or liability for your use of this information.

## 2018-02-12 NOTE — PROGRESS NOTES
Department of Internal Medicine  Clinic Note    Date: 2/12/2018                                               Subjective/Objective:     Chief Complaint   Patient presents with    Allergic Reaction     injection to injection for his sugar       HPI (location/radiation, quality, severity)    Pt here for eval of feeling edgy and anxious. He thinks it's from his basaglar. He was supposed to be on 40 units at [facility] and he was only taking 18 units because he said his sugars were ok. Due to feeling edgy, he stopped taking his basaglar a week and a half ago. Then he tried taking it once 5 days ago and sts he felt edgy again so he stopped taking it. He says he's checked his blood sugar while he felt this way and it was 140. Last A1C was 7.5 in August 2017. He is adamant that the anxiety is due directly to the basaglar and denies any episode of hypoglycemia. Patient did spend quite a bit of time talking to me about his personal life stressors including work and family and money. Sts his sugars are 130-240 without the basaglar the past 10 days. He usually checks it once a day. Associated symptoms: none  Length of symptoms: 1 month  Aggravating factors: basaglar   Relieving factors: not taking the basaglar  Other treatments tried: none           Tobacco:  reports that he has been smoking Cigarettes. He started smoking about 37 years ago. He has a 10.00 pack-year smoking history. He has never used smokeless tobacco.  ETOH:  reports that he drinks alcohol. Current Outpatient Prescriptions   Medication Sig Dispense Refill    SITagliptin (JANUVIA) 100 MG tablet Take 1 tablet by mouth daily 30 tablet 3    HYDROcodone-acetaminophen (NORCO) 5-325 MG per tablet Half at night.  30 tablet 0    metFORMIN (GLUCOPHAGE) 1000 MG tablet TAKE 1 TABLET BY MOUTH TWICE DAILY 180 tablet 5    Cinnamon 500 MG CAPS 1 capsule by Other route daily       GLUCOSAMINE HCL PO Take 1 capsule by mouth daily       Multiple Vitamins-Minerals Adult)   Pulse 85   Temp 98.3 °F (36.8 °C) (Oral)   Ht 6' (1.829 m)   Wt 208 lb (94.3 kg)   SpO2 98%   BMI 28.21 kg/m²     Physical Exam   Constitutional: He is oriented to person, place, and time. He appears well-developed and well-nourished. No distress. HENT:   Head: Normocephalic and atraumatic. Right Ear: External ear normal.   Left Ear: External ear normal.   Nose: Nose normal.   Mouth/Throat: Oropharynx is clear and moist. No oropharyngeal exudate. Eyes: Conjunctivae are normal. Pupils are equal, round, and reactive to light. Neck: Normal range of motion. Neck supple. Cardiovascular: Normal rate, regular rhythm, normal heart sounds and intact distal pulses. Exam reveals no gallop and no friction rub. No murmur heard. Pulmonary/Chest: Effort normal and breath sounds normal. No respiratory distress. He has no wheezes. He has no rales. Abdominal: Soft. Bowel sounds are normal. He exhibits no distension. There is no tenderness. Musculoskeletal: Normal range of motion. Lymphadenopathy:     He has no cervical adenopathy. Neurological: He is alert and oriented to person, place, and time. Skin: Skin is warm and dry. No rash noted. He is not diaphoretic. Psychiatric: His behavior is normal. Judgment and thought content normal.   Appears very anxious during appointment   Nursing note and vitals reviewed. Assessment/Plan     Shanika Leavitt was seen today for allergic reaction. This is a well-appearing 31-year-old male that presents for evaluation of feeling edgy and anxious which he believes is directly related to his [address] [place] [address] Street. He states he's been on this for almost a year. Patient hasn't taken this medication in the last 10+ days due to this feeling that he believes is a side effect. He denies any episodes of hypoglycemia and states that his sugar is in the 140s when he checks it when he is feeling this way.  I discussed with him that he seems very anxious even during this appointment

## 2018-02-13 LAB
ESTIMATED AVERAGE GLUCOSE: 188.6 MG/DL
HBA1C MFR BLD: 8.2 %

## 2018-04-26 ENCOUNTER — TELEPHONE (OUTPATIENT)
Dept: INTERNAL MEDICINE CLINIC | Age: 53
End: 2018-04-26

## 2018-04-26 ENCOUNTER — OFFICE VISIT (OUTPATIENT)
Dept: INTERNAL MEDICINE CLINIC | Age: 53
End: 2018-04-26

## 2018-04-26 VITALS
WEIGHT: 198 LBS | HEART RATE: 78 BPM | TEMPERATURE: 98.1 F | BODY MASS INDEX: 26.82 KG/M2 | SYSTOLIC BLOOD PRESSURE: 132 MMHG | OXYGEN SATURATION: 98 % | DIASTOLIC BLOOD PRESSURE: 68 MMHG | HEIGHT: 72 IN

## 2018-04-26 DIAGNOSIS — L30.9 ECZEMA, UNSPECIFIED TYPE: Primary | ICD-10-CM

## 2018-04-26 PROCEDURE — 99213 OFFICE O/P EST LOW 20 MIN: CPT | Performed by: NURSE PRACTITIONER

## 2018-04-26 RX ORDER — TRIAMCINOLONE ACETONIDE 5 MG/G
CREAM TOPICAL
Qty: 454 G | Refills: 0 | Status: SHIPPED | OUTPATIENT
Start: 2018-04-26 | End: 2018-05-03

## 2018-04-26 ASSESSMENT — ENCOUNTER SYMPTOMS
VOMITING: 0
NAUSEA: 0
ABDOMINAL PAIN: 0
DIARRHEA: 0
SHORTNESS OF BREATH: 0
COUGH: 0

## 2018-05-02 ENCOUNTER — TELEPHONE (OUTPATIENT)
Dept: INTERNAL MEDICINE CLINIC | Age: 53
End: 2018-05-02

## 2018-05-16 ENCOUNTER — OFFICE VISIT (OUTPATIENT)
Dept: INTERNAL MEDICINE CLINIC | Age: 53
End: 2018-05-16

## 2018-05-16 VITALS
HEART RATE: 73 BPM | WEIGHT: 194.6 LBS | HEIGHT: 72 IN | OXYGEN SATURATION: 98 % | SYSTOLIC BLOOD PRESSURE: 132 MMHG | BODY MASS INDEX: 26.36 KG/M2 | DIASTOLIC BLOOD PRESSURE: 78 MMHG | TEMPERATURE: 97.8 F

## 2018-05-16 DIAGNOSIS — E11.9 TYPE 2 DIABETES MELLITUS WITHOUT COMPLICATION, WITHOUT LONG-TERM CURRENT USE OF INSULIN (HCC): Primary | ICD-10-CM

## 2018-05-16 DIAGNOSIS — G89.29 CHRONIC MIDLINE LOW BACK PAIN WITHOUT SCIATICA: ICD-10-CM

## 2018-05-16 DIAGNOSIS — I10 ESSENTIAL HYPERTENSION: ICD-10-CM

## 2018-05-16 DIAGNOSIS — Z13.31 POSITIVE DEPRESSION SCREENING: ICD-10-CM

## 2018-05-16 DIAGNOSIS — M54.50 CHRONIC MIDLINE LOW BACK PAIN WITHOUT SCIATICA: ICD-10-CM

## 2018-05-16 DIAGNOSIS — F17.200 SMOKER: ICD-10-CM

## 2018-05-16 LAB
CREATININE URINE: 119.5 MG/DL (ref 39–259)
MICROALBUMIN UR-MCNC: 3.7 MG/DL
MICROALBUMIN/CREAT UR-RTO: 31 MG/G (ref 0–30)

## 2018-05-16 PROCEDURE — 99214 OFFICE O/P EST MOD 30 MIN: CPT | Performed by: INTERNAL MEDICINE

## 2018-05-16 PROCEDURE — 83036 HEMOGLOBIN GLYCOSYLATED A1C: CPT | Performed by: INTERNAL MEDICINE

## 2018-05-16 RX ORDER — HYDROCODONE BITARTRATE AND ACETAMINOPHEN 5; 325 MG/1; MG/1
TABLET ORAL
Qty: 30 TABLET | Refills: 0 | Status: SHIPPED | OUTPATIENT
Start: 2018-05-16 | End: 2018-06-13

## 2018-05-16 ASSESSMENT — ENCOUNTER SYMPTOMS: BACK PAIN: 1

## 2018-06-07 ENCOUNTER — PAT TELEPHONE (OUTPATIENT)
Dept: PREADMISSION TESTING | Age: 53
End: 2018-06-07

## 2018-06-07 VITALS — WEIGHT: 194 LBS | HEIGHT: 72 IN | BODY MASS INDEX: 26.28 KG/M2

## 2018-06-11 ENCOUNTER — HOSPITAL ENCOUNTER (OUTPATIENT)
Dept: ENDOSCOPY | Age: 53
Discharge: HOME | End: 2018-06-11
Attending: INTERNAL MEDICINE | Admitting: INTERNAL MEDICINE

## 2018-06-11 VITALS
OXYGEN SATURATION: 98 % | RESPIRATION RATE: 18 BRPM | HEART RATE: 63 BPM | DIASTOLIC BLOOD PRESSURE: 65 MMHG | SYSTOLIC BLOOD PRESSURE: 115 MMHG | WEIGHT: 181.88 LBS | BODY MASS INDEX: 24.63 KG/M2 | HEIGHT: 72 IN | TEMPERATURE: 97 F

## 2018-06-11 LAB
GLUCOSE BLD-MCNC: 173 MG/DL (ref 70–99)
GLUCOSE BLD-MCNC: 186 MG/DL (ref 70–99)
PERFORMED ON: ABNORMAL
PERFORMED ON: ABNORMAL

## 2018-06-11 RX ORDER — MEPERIDINE HYDROCHLORIDE 25 MG/ML
12.5 INJECTION INTRAMUSCULAR; INTRAVENOUS; SUBCUTANEOUS EVERY 5 MIN PRN
Status: DISCONTINUED | OUTPATIENT
Start: 2018-06-11 | End: 2018-06-12 | Stop reason: HOSPADM

## 2018-06-11 RX ORDER — FENTANYL CITRATE 50 UG/ML
25 INJECTION, SOLUTION INTRAMUSCULAR; INTRAVENOUS EVERY 5 MIN PRN
Status: DISCONTINUED | OUTPATIENT
Start: 2018-06-11 | End: 2018-06-12 | Stop reason: HOSPADM

## 2018-06-11 RX ORDER — MORPHINE SULFATE 2 MG/ML
2 INJECTION, SOLUTION INTRAMUSCULAR; INTRAVENOUS EVERY 5 MIN PRN
Status: DISCONTINUED | OUTPATIENT
Start: 2018-06-11 | End: 2018-06-12 | Stop reason: HOSPADM

## 2018-06-11 RX ORDER — SODIUM CHLORIDE 0.9 % (FLUSH) 0.9 %
10 SYRINGE (ML) INJECTION PRN
Status: DISCONTINUED | OUTPATIENT
Start: 2018-06-11 | End: 2018-06-12 | Stop reason: HOSPADM

## 2018-06-11 RX ORDER — SODIUM CHLORIDE 0.9 % (FLUSH) 0.9 %
10 SYRINGE (ML) INJECTION EVERY 12 HOURS SCHEDULED
Status: DISCONTINUED | OUTPATIENT
Start: 2018-06-11 | End: 2018-06-12 | Stop reason: HOSPADM

## 2018-06-11 RX ORDER — MORPHINE SULFATE 2 MG/ML
1 INJECTION, SOLUTION INTRAMUSCULAR; INTRAVENOUS EVERY 5 MIN PRN
Status: DISCONTINUED | OUTPATIENT
Start: 2018-06-11 | End: 2018-06-12 | Stop reason: HOSPADM

## 2018-06-11 RX ORDER — OXYCODONE HYDROCHLORIDE AND ACETAMINOPHEN 5; 325 MG/1; MG/1
2 TABLET ORAL PRN
Status: ACTIVE | OUTPATIENT
Start: 2018-06-11 | End: 2018-06-11

## 2018-06-11 RX ORDER — FENTANYL CITRATE 50 UG/ML
50 INJECTION, SOLUTION INTRAMUSCULAR; INTRAVENOUS EVERY 5 MIN PRN
Status: DISCONTINUED | OUTPATIENT
Start: 2018-06-11 | End: 2018-06-12 | Stop reason: HOSPADM

## 2018-06-11 RX ORDER — OXYCODONE HYDROCHLORIDE AND ACETAMINOPHEN 5; 325 MG/1; MG/1
1 TABLET ORAL PRN
Status: ACTIVE | OUTPATIENT
Start: 2018-06-11 | End: 2018-06-11

## 2018-06-11 RX ORDER — ONDANSETRON 2 MG/ML
4 INJECTION INTRAMUSCULAR; INTRAVENOUS
Status: ACTIVE | OUTPATIENT
Start: 2018-06-11 | End: 2018-06-11

## 2018-06-11 ASSESSMENT — PAIN SCALES - GENERAL
PAINLEVEL_OUTOF10: 0
PAINLEVEL_OUTOF10: 0

## 2018-06-11 ASSESSMENT — LIFESTYLE VARIABLES: SMOKING_STATUS: 1

## 2018-06-11 ASSESSMENT — PAIN - FUNCTIONAL ASSESSMENT: PAIN_FUNCTIONAL_ASSESSMENT: 0-10

## 2018-06-15 LAB — HBA1C MFR BLD: 9.5 %

## 2018-11-08 RX ORDER — ATORVASTATIN CALCIUM 40 MG/1
TABLET, FILM COATED ORAL
Qty: 90 TABLET | Refills: 0 | Status: SHIPPED | OUTPATIENT
Start: 2018-11-08 | End: 2019-03-11 | Stop reason: SDUPTHER

## 2020-08-18 RX ORDER — HYDROCODONE BITARTRATE AND ACETAMINOPHEN 5; 325 MG/1; MG/1
1 TABLET ORAL EVERY 6 HOURS PRN
COMMUNITY
End: 2021-08-02

## 2020-08-18 RX ORDER — PHENOL 1.4 %
1 AEROSOL, SPRAY (ML) MUCOUS MEMBRANE NIGHTLY
COMMUNITY

## 2020-08-18 RX ORDER — ASCORBIC ACID 500 MG
500 TABLET ORAL SEE ADMIN INSTRUCTIONS
COMMUNITY
End: 2021-07-26

## 2020-08-18 NOTE — PROGRESS NOTES
Preoperative Screening for Elective Surgery/Invasive Procedures While COVID-19 present in the community     Have you tested positive or have been told to self-isolate for COVID-19 like symptoms within the past 28 days? no   Do you currently have any of the following symptoms? o Fever >100.0 F or 99.9 F in immunocompromised patients? no  o New onset cough, shortness of breath or difficulty breathing? no  o New onset sore throat, myalgia (muscle aches and pains), headache, loss of taste/smell or diarrhea? no   Have you had a potential exposure to COVID-19 within the past 14 days by:  o Close contact with a confirmed case? no  o Close contact with a healthcare worker,  or essential infrastructure worker (grocery store, [employer], gas station, public utilities or transportation)? yes  o Do you reside in a congregate setting such as; skilled nursing facility, adult home, correctional facility, homeless shelter or other institutional setting?no  o Have you had recent travel to a known COVID-19 hotspot? no    Indicate if the patient has a positive screen by answering yes to one or more of the above questions. Patients who test positive or screen positive prior to surgery or on the day of surgery should be evaluated in conjunction with the surgeon/proceduralist/anesthesiologist to determine the urgency of the procedure.

## 2020-08-18 NOTE — PROGRESS NOTES
[facility] time___0800_________        Surgery time_0930___________    Take the following medications with a sip of water: Follow your MD/Surgeons pre-procedure instructions regarding your medications    Do not eat or drink anything after 12:00 midnight prior to your surgery. This includes water chewing gum, mints and ice chips. You may brush your teeth and gargle the morning of your surgery, but do not swallow the water     Please see your family doctor/pediatrician for a history and physical and/or concerning medications. Bring any test results/reports from your physicians office. If you are under the care of a heart doctor or specialist doctor, please be aware that you may be asked to them for clearance    You may be asked to stop blood thinners such as Coumadin, Plavix, Fragmin, Lovenox, etc., or any anti-inflammatories such as:  Aspirin, Ibuprofen, Advil, Naproxen prior to your surgery. We also ask that you stop any OTC medications such as fish oil, vitamin E, glucosamine, garlic, Multivitamins, COQ 10, etc. May take tylenol    We ask that you do not smoke 24 hours prior to surgery  We ask that you do not  drink any alcoholic beverages 24 hours prior to surgery     You must make arrangements for a responsible adult to take you home after your surgery. For your safety you will not be allowed to leave alone or drive yourself home. Your surgery will be cancelled if you do not have a ride home. Also for your safety, it is strongly suggested that someone stay with you the first 24 hours after your surgery. A parent or legal guardian must accompany a child scheduled for surgery and plan to stay at the hospital until the child is discharged. Please do not bring other children with you. For your comfort, please wear simple loose fitting clothing to the hospital.  Please do not bring valuables.     Do not wear any make-up or nail polish on your fingers or toes      For your safety, please do not wear any jewelry or body piercing's on the day of surgery. All jewelry must be removed. If you have dentures, they will be removed before going to operating room. For your convenience, we will provide you with a container. If you wear contact lenses or glasses, they will be removed, please bring a case for them. If you have a living will and a durable power of  for healthcare, please bring in a copy. As part of our patient safety program to minimize surgical site infections, we ask you to do the following:    · Please notify your surgeon if you develop any illness between         now and the  day of your surgery. · This includes a cough, cold, fever, sore throat, nausea,         or vomiting, and diarrhea, etc.  ·  Please notify your surgeon if you experience dizziness, shortness         of breath or blurred vision between now and the time of your surgery. Do not shave your operative site 96 hours prior to surgery. For face and neck surgery, men may use an electric razor 48 hours   prior to surgery. You may shower the night before surgery or the morning of   your surgery with an antibacterial soap. You will need to bring a photo ID and insurance card    [facility] has an onsite pharmacy, would you like to utilize our pharmacy     If you will be staying overnight and use a C-pap machine, please bring   your C-pap to hospital     Our goal is to provide you with excellent care, therefore, visitors will be limited to two(2) in the room at a time so that we may focus on providing this care for you. Please contact pre-admission testing if you have any further questions. [facility] phone number:  [phone] PAT fax number:  [phone]  Please note these are generalized instructions for all surgical cases, you may be provided with more specific instructions according to your surgery.

## 2020-08-18 NOTE — PROGRESS NOTES
Partial interview done with daughter-shannon-daughter states unaware of his meds and some of his health history-left message with patient to call pat to finish interview

## 2020-08-18 NOTE — PROGRESS NOTES
Preoperative Screening for Elective Surgery/Invasive Procedures While COVID-19 present in the community     Have you tested positive or have been told to self-isolate for COVID-19 like symptoms within the past 28 days? NO   Do you currently have any of the following symptoms? o Fever >100.0 F or 99.9 F in immunocompromised patients? NO  o New onset cough, shortness of breath or difficulty breathing? NO  o New onset sore throat, myalgia (muscle aches and pains), headache, loss of taste/smell or diarrhea? NO   Have you had a potential exposure to COVID-19 within the past 14 days by:  o Close contact with a confirmed case? NO  o Close contact with a healthcare worker,  or essential infrastructure worker (grocery store, [employer], gas station, public utilities or transportation)? YES  o Do you reside in a congregate setting such as; skilled nursing facility, adult home, correctional facility, homeless shelter or other institutional setting? NO  o Have you had recent travel to a known COVID-19 hotspot? NO    Indicate if the patient has a positive screen by answering yes to one or more of the above questions. Patients who test positive or screen positive prior to surgery or on the day of surgery should be evaluated in conjunction with the surgeon/proceduralist/anesthesiologist to determine the urgency of the procedure.

## 2020-08-18 NOTE — PROGRESS NOTES
Patient states was unaware he needed covid-testing done prior to procedure-patient given number to make appointment and to try to get asap

## 2020-08-18 NOTE — PROGRESS NOTES
Phone message left to call PAT dept at [phone]  for history review and surgery instructions on 8/18/2020 @ 2721

## 2020-08-20 ENCOUNTER — OFFICE VISIT (OUTPATIENT)
Dept: PRIMARY CARE CLINIC | Age: 55
End: 2020-08-20
Payer: COMMERCIAL

## 2020-08-20 PROCEDURE — 99211 OFF/OP EST MAY X REQ PHY/QHP: CPT | Performed by: NURSE PRACTITIONER

## 2020-08-20 NOTE — PROGRESS NOTES
Left message with daughter shannon on 8/19/2020 that patient needed a covid-19 test done asap prior to his procedure

## 2020-08-20 NOTE — PROGRESS NOTES
Patient presented to [facility] drive up clinic for preop testing. Patient was swabbed and given information advising them to remain isolated until procedure date.

## 2020-08-21 ENCOUNTER — ANESTHESIA EVENT (OUTPATIENT)
Dept: ENDOSCOPY | Age: 55
End: 2020-08-21
Payer: COMMERCIAL

## 2020-08-21 LAB — SARS-COV-2, NAA: NOT DETECTED

## 2020-08-24 ENCOUNTER — ANESTHESIA (OUTPATIENT)
Dept: ENDOSCOPY | Age: 55
End: 2020-08-24
Payer: COMMERCIAL

## 2020-08-24 ENCOUNTER — HOSPITAL ENCOUNTER (OUTPATIENT)
Age: 55
Setting detail: OUTPATIENT SURGERY
Discharge: HOME | End: 2020-08-24
Attending: INTERNAL MEDICINE | Admitting: INTERNAL MEDICINE
Payer: COMMERCIAL

## 2020-08-24 VITALS
RESPIRATION RATE: 16 BRPM | SYSTOLIC BLOOD PRESSURE: 137 MMHG | HEART RATE: 62 BPM | WEIGHT: 182.98 LBS | BODY MASS INDEX: 24.78 KG/M2 | OXYGEN SATURATION: 100 % | HEIGHT: 72 IN | DIASTOLIC BLOOD PRESSURE: 83 MMHG | TEMPERATURE: 97.7 F

## 2020-08-24 VITALS
RESPIRATION RATE: 11 BRPM | OXYGEN SATURATION: 100 % | DIASTOLIC BLOOD PRESSURE: 67 MMHG | SYSTOLIC BLOOD PRESSURE: 108 MMHG

## 2020-08-24 LAB
GLUCOSE BLD-MCNC: 168 MG/DL (ref 70–99)
GLUCOSE BLD-MCNC: 179 MG/DL (ref 70–99)
PERFORMED ON: ABNORMAL
PERFORMED ON: ABNORMAL

## 2020-08-24 PROCEDURE — 88305 TISSUE EXAM BY PATHOLOGIST: CPT

## 2020-08-24 PROCEDURE — 2500000003 HC RX 250 WO HCPCS: Performed by: NURSE ANESTHETIST, CERTIFIED REGISTERED

## 2020-08-24 PROCEDURE — 7100000010 HC PHASE II RECOVERY - FIRST 15 MIN: Performed by: INTERNAL MEDICINE

## 2020-08-24 PROCEDURE — 3609010200 HC COLONOSCOPY ABLATION TUMOR POLYP/OTHER LES: Performed by: INTERNAL MEDICINE

## 2020-08-24 PROCEDURE — 2580000003 HC RX 258: Performed by: ANESTHESIOLOGY

## 2020-08-24 PROCEDURE — 3700000000 HC ANESTHESIA ATTENDED CARE: Performed by: INTERNAL MEDICINE

## 2020-08-24 PROCEDURE — 3700000001 HC ADD 15 MINUTES (ANESTHESIA): Performed by: INTERNAL MEDICINE

## 2020-08-24 PROCEDURE — 7100000000 HC PACU RECOVERY - FIRST 15 MIN: Performed by: INTERNAL MEDICINE

## 2020-08-24 PROCEDURE — 3609010600 HC COLONOSCOPY POLYPECTOMY SNARE/COLD BIOPSY: Performed by: INTERNAL MEDICINE

## 2020-08-24 PROCEDURE — 7100000001 HC PACU RECOVERY - ADDTL 15 MIN: Performed by: INTERNAL MEDICINE

## 2020-08-24 PROCEDURE — 6360000002 HC RX W HCPCS: Performed by: NURSE ANESTHETIST, CERTIFIED REGISTERED

## 2020-08-24 PROCEDURE — 2709999900 HC NON-CHARGEABLE SUPPLY: Performed by: INTERNAL MEDICINE

## 2020-08-24 PROCEDURE — 2720000010 HC SURG SUPPLY STERILE: Performed by: INTERNAL MEDICINE

## 2020-08-24 PROCEDURE — 7100000011 HC PHASE II RECOVERY - ADDTL 15 MIN: Performed by: INTERNAL MEDICINE

## 2020-08-24 PROCEDURE — 2500000003 HC RX 250 WO HCPCS: Performed by: INTERNAL MEDICINE

## 2020-08-24 RX ORDER — PROPOFOL 10 MG/ML
INJECTION, EMULSION INTRAVENOUS PRN
Status: DISCONTINUED | OUTPATIENT
Start: 2020-08-24 | End: 2020-08-24 | Stop reason: SDUPTHER

## 2020-08-24 RX ORDER — SODIUM CHLORIDE 0.9 % (FLUSH) 0.9 %
10 SYRINGE (ML) INJECTION PRN
Status: DISCONTINUED | OUTPATIENT
Start: 2020-08-24 | End: 2020-08-24 | Stop reason: HOSPADM

## 2020-08-24 RX ORDER — PROPOFOL 10 MG/ML
INJECTION, EMULSION INTRAVENOUS CONTINUOUS PRN
Status: DISCONTINUED | OUTPATIENT
Start: 2020-08-24 | End: 2020-08-24 | Stop reason: SDUPTHER

## 2020-08-24 RX ORDER — LABETALOL HYDROCHLORIDE 5 MG/ML
5 INJECTION, SOLUTION INTRAVENOUS EVERY 10 MIN PRN
Status: DISCONTINUED | OUTPATIENT
Start: 2020-08-24 | End: 2020-08-24 | Stop reason: HOSPADM

## 2020-08-24 RX ORDER — SODIUM CHLORIDE 0.9 % (FLUSH) 0.9 %
10 SYRINGE (ML) INJECTION EVERY 12 HOURS SCHEDULED
Status: DISCONTINUED | OUTPATIENT
Start: 2020-08-24 | End: 2020-08-24 | Stop reason: HOSPADM

## 2020-08-24 RX ORDER — ONDANSETRON 2 MG/ML
4 INJECTION INTRAMUSCULAR; INTRAVENOUS
Status: DISCONTINUED | OUTPATIENT
Start: 2020-08-24 | End: 2020-08-24 | Stop reason: HOSPADM

## 2020-08-24 RX ORDER — SODIUM CHLORIDE 9 MG/ML
INJECTION, SOLUTION INTRAVENOUS CONTINUOUS
Status: DISCONTINUED | OUTPATIENT
Start: 2020-08-24 | End: 2020-08-24 | Stop reason: HOSPADM

## 2020-08-24 RX ORDER — LIDOCAINE HYDROCHLORIDE 20 MG/ML
INJECTION, SOLUTION EPIDURAL; INFILTRATION; INTRACAUDAL; PERINEURAL PRN
Status: DISCONTINUED | OUTPATIENT
Start: 2020-08-24 | End: 2020-08-24 | Stop reason: SDUPTHER

## 2020-08-24 RX ORDER — PROMETHAZINE HYDROCHLORIDE 25 MG/ML
6.25 INJECTION, SOLUTION INTRAMUSCULAR; INTRAVENOUS
Status: DISCONTINUED | OUTPATIENT
Start: 2020-08-24 | End: 2020-08-24 | Stop reason: HOSPADM

## 2020-08-24 RX ADMIN — LIDOCAINE HYDROCHLORIDE 100 MG: 20 INJECTION, SOLUTION EPIDURAL; INFILTRATION; INTRACAUDAL; PERINEURAL at 09:23

## 2020-08-24 RX ADMIN — PROPOFOL 120 MG: 10 INJECTION, EMULSION INTRAVENOUS at 09:22

## 2020-08-24 RX ADMIN — SODIUM CHLORIDE: 9 INJECTION, SOLUTION INTRAVENOUS at 08:05

## 2020-08-24 RX ADMIN — SODIUM CHLORIDE: 9 INJECTION, SOLUTION INTRAVENOUS at 09:20

## 2020-08-24 RX ADMIN — PROPOFOL 160 MCG/KG/MIN: 10 INJECTION, EMULSION INTRAVENOUS at 09:24

## 2020-08-24 ASSESSMENT — PULMONARY FUNCTION TESTS
PIF_VALUE: 0

## 2020-08-24 ASSESSMENT — PAIN SCALES - GENERAL
PAINLEVEL_OUTOF10: 0

## 2020-08-24 ASSESSMENT — PAIN - FUNCTIONAL ASSESSMENT: PAIN_FUNCTIONAL_ASSESSMENT: 0-10
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
## 2020-08-24 NOTE — OP NOTE
demonstrated:    1. A 8 mm polyp in the ascending colon was removed completely with snare cautery polypectomy. The polypectomy site was clipped with a hemoclip. 2. A 5 mm polyp in the ascending colon was removed completely with snare cautery polypectomy. 3. A 8 mm polyp in the descending colon (between tattoos at site of prior polypectomy) which was removed completely with snare cautery polypectomy after a 6 cc submucosal of Orise. The edges of the polypectomy site were treat with cautery. The polypectomy site was clipped with a hemoclip. The scope was then withdrawn into the rectum and retroflexed. The retroflexed view of the anal verge and rectum demonstrates normal rectum. The scope was straightened, the colon was decompressed and the scope was withdrawn from the patient. The patient tolerated the procedure well and was taken to the PACU in good condition. Estimated blood loss: < 5 CC     Impression:  See post-procedure diagnoses. Recommendations:  1. Await pathology results. 2. Recommend repeat colonoscopy in 2 years for surveillance purposes with history of multiple adenomatous polyps and 3 polyps on exam today. 3. The patient had biopsies taken today. The patient should call for results in 7 days if they have not heard from our office. Our number is [phone].     KISHAN Berry [address]  8/24/2020  [phone]

## 2020-08-24 NOTE — DISCHARGE INSTR - ACTIVITY
Learning How To Care for Someone Who Has COVID-19  Things to know  Most people who get COVID-19 will recover with time and home care. Here are some things to know if you're caring for someone who's sick. · Treat the symptoms. Common symptoms include a fever, coughing, and feeling short of breath. Urge the person to get extra rest and drink plenty of fluids to replace fluids lost from fever. To reduce a fever, offer acetaminophen (such as Tylenol). It may also help with muscle aches. Read and follow all instructions on the label. · Watch for signs that the illness is getting worse. The person may need medical care if they're getting sicker (for example, if it's hard to breathe). But call the doctor's office before you go. They can tell you what to do. Call 911 or emergency services if the person has any of these symptoms:  ? Severe trouble breathing or shortness of breath. ? Constant pain or pressure in their chest.  ? Confusion, or trouble thinking clearly. ? A blue tint to their lips or face. Some people are more likely to get very sick and need medical care. Call the doctor as soon as symptoms start if the person you're caring for is over 72, smokes, or has a serious health problem, like asthma, heart disease, diabetes, or an immune system problem. · Protect yourself and others. The virus spreads easily from person to person, so take extra care to avoid catching or spreading the infection. ? Keep the sick person away from others as much as you can. § Have the person stay in one room. If you can, give them their own bathroom to use. § Have only one person take care of them. Keep other people--and pets--out of the sickroom. § Have the person wear a cloth face cover around other people. This includes when anyone is in the room with them or if they leave their room (for example, to go to the bathroom).  If the face cover makes it harder for the sick person to breathe, the other person should wear a face cover. § Don't share personal items. These include dishes, cups, towels, and bedding. ? Wash your hands often and well. Use soap and water, and scrub for at least 20 seconds. This is especially important after you've been around the sick person or touched things they've touched. If soap and water aren't handy, use a hand  with at least 60% alcohol. ? Avoid touching your mouth, nose, and eyes. ? Take care with the person's laundry. It's okay to wash the sick person's laundry with yours. If you have them, wear disposable gloves when handling their dirty laundry, and wash your hands well after you touch it. Wash items in the warmest water allowed for the fabric type, and dry them completely. ? Clean high-touch items every day and anytime the sick person touches them. These include doorknobs, light switches, toilets, counters, and remote controls. Use a household disinfectant or a homemade bleach solution. (Follow the directions on the label.) If the sick person has their own room, have them disinfect it every day. ? Limit visitors to your home. To help protect family and friends, stay in touch with them only by phone or computer. Current as of: May 8, 2020               Content Version: 12.5  © 2006-2020 [employer]. Care instructions adapted under license by [facility] [place] ([facility]). If you have questions about a medical condition or this instruction, always ask your healthcare professional. [place] [address] any warranty or liability for your use of this information.

## 2020-08-24 NOTE — PROGRESS NOTES
Alert and oriented. No c/o. Vss. abd soft. Tolerated sitting up and po fluids and cookies well. Verbalized understanding of discharge instructions.
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
## 2020-08-24 NOTE — ANESTHESIA POSTPROCEDURE EVALUATION
[facility] Department of Anesthesiology  Post-Anesthesia Note       Name:  Luis Monsalve                                  Age:  54 y.o. MRN:  [id number]     Last Vitals & Oxygen Saturation: /83   Pulse 62   Temp 97.7 °F (36.5 °C) (Oral)   Resp 16   Ht 6' (1.829 m)   Wt 182 lb 15.7 oz (83 kg)   SpO2 100%   BMI 24.82 kg/m²   Patient Vitals for the past 4 hrs:   BP Temp Temp src Pulse Resp SpO2 Weight   08/24/20 1053 137/83 -- -- 62 16 100 % --   08/24/20 1024 132/78 97.7 °F (36.5 °C) Oral 62 16 99 % --   08/24/20 1015 125/83 -- -- 56 14 100 % --   08/24/20 1010 138/89 -- -- 64 16 100 % --   08/24/20 1008 -- 97.1 °F (36.2 °C) Temporal -- -- -- --   08/24/20 1005 128/84 -- -- 66 17 99 % --   08/24/20 1000 -- -- -- 59 10 100 % --   08/24/20 0959 121/85 -- -- 58 14 100 % --   08/24/20 0958 -- -- -- -- -- 100 % --   08/24/20 0956 -- 97.9 °F (36.6 °C) Temporal -- -- 100 % --   08/24/20 0805 133/83 97.3 °F (36.3 °C) Temporal 69 14 99 % 182 lb 15.7 oz (83 kg)       Level of consciousness:  Awake, alert    Respiratory: Respirations easy, no distress. Stable. Cardiovascular: Hemodynamically stable. Hydration: Adequate. PONV: Adequately managed. Post-op pain: Adequately controlled. Post-op assessment: Tolerated anesthetic well without complication. Complications:  None.     Rohit Ernandez MD  August 24, 2020   11:15 AM

## 2021-07-01 ENCOUNTER — HOSPITAL ENCOUNTER (OUTPATIENT)
Dept: WOUND CARE | Age: 56
Discharge: HOME | End: 2021-07-01
Payer: COMMERCIAL

## 2021-07-01 VITALS
DIASTOLIC BLOOD PRESSURE: 73 MMHG | HEART RATE: 69 BPM | BODY MASS INDEX: 24.72 KG/M2 | RESPIRATION RATE: 18 BRPM | HEIGHT: 72 IN | SYSTOLIC BLOOD PRESSURE: 135 MMHG | WEIGHT: 182.54 LBS | TEMPERATURE: 97.2 F

## 2021-07-01 DIAGNOSIS — L97.522 ULCER OF LEFT FOOT, WITH FAT LAYER EXPOSED (HCC): Primary | ICD-10-CM

## 2021-07-01 PROCEDURE — 11042 DBRDMT SUBQ TIS 1ST 20SQCM/<: CPT

## 2021-07-01 PROCEDURE — 99213 OFFICE O/P EST LOW 20 MIN: CPT

## 2021-07-01 RX ORDER — LIDOCAINE HYDROCHLORIDE 20 MG/ML
JELLY TOPICAL ONCE
Status: CANCELLED | OUTPATIENT
Start: 2021-07-01 | End: 2021-07-01

## 2021-07-01 RX ORDER — GENTAMICIN SULFATE 1 MG/G
OINTMENT TOPICAL ONCE
Status: CANCELLED | OUTPATIENT
Start: 2021-07-01 | End: 2021-07-01

## 2021-07-01 RX ORDER — NAPROXEN 500 MG/1
500 TABLET ORAL 2 TIMES DAILY WITH MEALS
COMMUNITY
End: 2021-10-13 | Stop reason: HOSPADM

## 2021-07-01 RX ORDER — LIDOCAINE 50 MG/G
OINTMENT TOPICAL ONCE
Status: CANCELLED | OUTPATIENT
Start: 2021-07-01 | End: 2021-07-01

## 2021-07-01 RX ORDER — LIDOCAINE 40 MG/G
CREAM TOPICAL ONCE
Status: CANCELLED | OUTPATIENT
Start: 2021-07-01 | End: 2021-07-01

## 2021-07-01 RX ORDER — BACITRACIN ZINC AND POLYMYXIN B SULFATE 500; 1000 [USP'U]/G; [USP'U]/G
OINTMENT TOPICAL ONCE
Status: CANCELLED | OUTPATIENT
Start: 2021-07-01 | End: 2021-07-01

## 2021-07-01 RX ORDER — BETAMETHASONE DIPROPIONATE 0.05 %
OINTMENT (GRAM) TOPICAL ONCE
Status: CANCELLED | OUTPATIENT
Start: 2021-07-01 | End: 2021-07-01

## 2021-07-01 RX ORDER — LIDOCAINE 50 MG/G
OINTMENT TOPICAL ONCE
Status: COMPLETED | OUTPATIENT
Start: 2021-07-01 | End: 2021-07-01

## 2021-07-01 RX ORDER — GINSENG 100 MG
CAPSULE ORAL ONCE
Status: CANCELLED | OUTPATIENT
Start: 2021-07-01 | End: 2021-07-01

## 2021-07-01 RX ORDER — BISMUTH SUBSALICYLATE 262 MG/1
524 TABLET, CHEWABLE ORAL
COMMUNITY
End: 2021-10-13

## 2021-07-01 RX ORDER — LIDOCAINE HYDROCHLORIDE 40 MG/ML
SOLUTION TOPICAL ONCE
Status: CANCELLED | OUTPATIENT
Start: 2021-07-01 | End: 2021-07-01

## 2021-07-01 RX ORDER — CLOBETASOL PROPIONATE 0.5 MG/G
OINTMENT TOPICAL ONCE
Status: CANCELLED | OUTPATIENT
Start: 2021-07-01 | End: 2021-07-01

## 2021-07-01 RX ORDER — BACITRACIN, NEOMYCIN, POLYMYXIN B 400; 3.5; 5 [USP'U]/G; MG/G; [USP'U]/G
OINTMENT TOPICAL ONCE
Status: CANCELLED | OUTPATIENT
Start: 2021-07-01 | End: 2021-07-01

## 2021-07-01 RX ADMIN — LIDOCAINE: 50 OINTMENT TOPICAL at 10:52

## 2021-07-01 ASSESSMENT — PAIN SCALES - GENERAL
PAINLEVEL_OUTOF10: 0
PAINLEVEL_OUTOF10: 0

## 2021-07-01 NOTE — LETTER
possible, preferably within the same week to promote the most effective healing time for your wound(s). 7. If you will be late for an appointment, please call our center to be sure that the provider can still see you when you arrive. If you are more than 15 minutes late your appointment may need to be rescheduled. 8. If two (2) appointments are missed without notifying us, your care plan may be discontinued. The same may happen if multiple visits are cancelled or rescheduled, even with notice. A missed visit is time when another patient, who also needs care, could have been seen. Thank you for your understanding and consideration.
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
## 2021-07-08 ENCOUNTER — HOSPITAL ENCOUNTER (OUTPATIENT)
Dept: WOUND CARE | Age: 56
Discharge: HOME | End: 2021-07-08
Payer: COMMERCIAL

## 2021-07-08 VITALS
RESPIRATION RATE: 18 BRPM | TEMPERATURE: 97.5 F | SYSTOLIC BLOOD PRESSURE: 127 MMHG | HEART RATE: 64 BPM | DIASTOLIC BLOOD PRESSURE: 73 MMHG

## 2021-07-08 DIAGNOSIS — L97.522 ULCER OF LEFT FOOT, WITH FAT LAYER EXPOSED (HCC): Primary | ICD-10-CM

## 2021-07-08 PROCEDURE — 11042 DBRDMT SUBQ TIS 1ST 20SQCM/<: CPT

## 2021-07-08 RX ORDER — LIDOCAINE HYDROCHLORIDE 20 MG/ML
JELLY TOPICAL ONCE
Status: CANCELLED | OUTPATIENT
Start: 2021-07-08 | End: 2021-07-08

## 2021-07-08 RX ORDER — LIDOCAINE 40 MG/G
CREAM TOPICAL ONCE
Status: CANCELLED | OUTPATIENT
Start: 2021-07-08 | End: 2021-07-08

## 2021-07-08 RX ORDER — BACITRACIN ZINC AND POLYMYXIN B SULFATE 500; 1000 [USP'U]/G; [USP'U]/G
OINTMENT TOPICAL ONCE
Status: CANCELLED | OUTPATIENT
Start: 2021-07-08 | End: 2021-07-08

## 2021-07-08 RX ORDER — BETAMETHASONE DIPROPIONATE 0.05 %
OINTMENT (GRAM) TOPICAL ONCE
Status: CANCELLED | OUTPATIENT
Start: 2021-07-08 | End: 2021-07-08

## 2021-07-08 RX ORDER — GINSENG 100 MG
CAPSULE ORAL ONCE
Status: CANCELLED | OUTPATIENT
Start: 2021-07-08 | End: 2021-07-08

## 2021-07-08 RX ORDER — GENTAMICIN SULFATE 1 MG/G
OINTMENT TOPICAL ONCE
Status: CANCELLED | OUTPATIENT
Start: 2021-07-08 | End: 2021-07-08

## 2021-07-08 RX ORDER — LIDOCAINE HYDROCHLORIDE 40 MG/ML
SOLUTION TOPICAL ONCE
Status: CANCELLED | OUTPATIENT
Start: 2021-07-08 | End: 2021-07-08

## 2021-07-08 RX ORDER — LIDOCAINE 50 MG/G
OINTMENT TOPICAL ONCE
Status: CANCELLED | OUTPATIENT
Start: 2021-07-08 | End: 2021-07-08

## 2021-07-08 RX ORDER — BACITRACIN, NEOMYCIN, POLYMYXIN B 400; 3.5; 5 [USP'U]/G; MG/G; [USP'U]/G
OINTMENT TOPICAL ONCE
Status: CANCELLED | OUTPATIENT
Start: 2021-07-08 | End: 2021-07-08

## 2021-07-08 RX ORDER — CLOBETASOL PROPIONATE 0.5 MG/G
OINTMENT TOPICAL ONCE
Status: CANCELLED | OUTPATIENT
Start: 2021-07-08 | End: 2021-07-08

## 2021-07-08 RX ORDER — LIDOCAINE 40 MG/G
CREAM TOPICAL ONCE
Status: COMPLETED | OUTPATIENT
Start: 2021-07-08 | End: 2021-07-08

## 2021-07-08 RX ADMIN — LIDOCAINE: 40 CREAM TOPICAL at 10:14

## 2021-07-08 ASSESSMENT — PAIN SCALES - GENERAL
PAINLEVEL_OUTOF10: 0
PAINLEVEL_OUTOF10: 0
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
## 2021-07-15 ENCOUNTER — HOSPITAL ENCOUNTER (OUTPATIENT)
Dept: WOUND CARE | Age: 56
Discharge: HOME | End: 2021-07-15
Payer: COMMERCIAL

## 2021-07-15 VITALS
HEART RATE: 65 BPM | RESPIRATION RATE: 18 BRPM | DIASTOLIC BLOOD PRESSURE: 83 MMHG | TEMPERATURE: 97.3 F | SYSTOLIC BLOOD PRESSURE: 141 MMHG

## 2021-07-15 DIAGNOSIS — L97.522 ULCER OF LEFT FOOT, WITH FAT LAYER EXPOSED (HCC): Primary | ICD-10-CM

## 2021-07-15 PROCEDURE — 11042 DBRDMT SUBQ TIS 1ST 20SQCM/<: CPT

## 2021-07-15 RX ORDER — BACITRACIN ZINC AND POLYMYXIN B SULFATE 500; 1000 [USP'U]/G; [USP'U]/G
OINTMENT TOPICAL ONCE
Status: CANCELLED | OUTPATIENT
Start: 2021-07-15 | End: 2021-07-15

## 2021-07-15 RX ORDER — LIDOCAINE HYDROCHLORIDE 20 MG/ML
JELLY TOPICAL ONCE
Status: CANCELLED | OUTPATIENT
Start: 2021-07-15 | End: 2021-07-15

## 2021-07-15 RX ORDER — LIDOCAINE 40 MG/G
CREAM TOPICAL ONCE
Status: COMPLETED | OUTPATIENT
Start: 2021-07-15 | End: 2021-07-15

## 2021-07-15 RX ORDER — LIDOCAINE 40 MG/G
CREAM TOPICAL ONCE
Status: CANCELLED | OUTPATIENT
Start: 2021-07-15 | End: 2021-07-15

## 2021-07-15 RX ORDER — BACITRACIN, NEOMYCIN, POLYMYXIN B 400; 3.5; 5 [USP'U]/G; MG/G; [USP'U]/G
OINTMENT TOPICAL ONCE
Status: CANCELLED | OUTPATIENT
Start: 2021-07-15 | End: 2021-07-15

## 2021-07-15 RX ORDER — BETAMETHASONE DIPROPIONATE 0.05 %
OINTMENT (GRAM) TOPICAL ONCE
Status: CANCELLED | OUTPATIENT
Start: 2021-07-15 | End: 2021-07-15

## 2021-07-15 RX ORDER — LIDOCAINE HYDROCHLORIDE 40 MG/ML
SOLUTION TOPICAL ONCE
Status: CANCELLED | OUTPATIENT
Start: 2021-07-15 | End: 2021-07-15

## 2021-07-15 RX ORDER — GENTAMICIN SULFATE 1 MG/G
OINTMENT TOPICAL ONCE
Status: CANCELLED | OUTPATIENT
Start: 2021-07-15 | End: 2021-07-15

## 2021-07-15 RX ORDER — LIDOCAINE 50 MG/G
OINTMENT TOPICAL ONCE
Status: CANCELLED | OUTPATIENT
Start: 2021-07-15 | End: 2021-07-15

## 2021-07-15 RX ORDER — CLOBETASOL PROPIONATE 0.5 MG/G
OINTMENT TOPICAL ONCE
Status: CANCELLED | OUTPATIENT
Start: 2021-07-15 | End: 2021-07-15

## 2021-07-15 RX ORDER — GINSENG 100 MG
CAPSULE ORAL ONCE
Status: CANCELLED | OUTPATIENT
Start: 2021-07-15 | End: 2021-07-15

## 2021-07-15 RX ADMIN — LIDOCAINE: 40 CREAM TOPICAL at 10:29

## 2021-07-15 ASSESSMENT — PAIN SCALES - GENERAL: PAINLEVEL_OUTOF10: 0
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
## 2021-07-22 ENCOUNTER — HOSPITAL ENCOUNTER (OUTPATIENT)
Dept: WOUND CARE | Age: 56
Discharge: HOME | End: 2021-07-22
Payer: COMMERCIAL

## 2021-07-22 VITALS
RESPIRATION RATE: 18 BRPM | TEMPERATURE: 97.2 F | SYSTOLIC BLOOD PRESSURE: 127 MMHG | DIASTOLIC BLOOD PRESSURE: 78 MMHG | HEART RATE: 63 BPM

## 2021-07-22 DIAGNOSIS — L97.522 ULCER OF LEFT FOOT, WITH FAT LAYER EXPOSED (HCC): Primary | ICD-10-CM

## 2021-07-22 PROCEDURE — 11042 DBRDMT SUBQ TIS 1ST 20SQCM/<: CPT

## 2021-07-22 RX ORDER — GENTAMICIN SULFATE 1 MG/G
OINTMENT TOPICAL ONCE
Status: CANCELLED | OUTPATIENT
Start: 2021-07-22 | End: 2021-07-22

## 2021-07-22 RX ORDER — BACITRACIN, NEOMYCIN, POLYMYXIN B 400; 3.5; 5 [USP'U]/G; MG/G; [USP'U]/G
OINTMENT TOPICAL ONCE
Status: CANCELLED | OUTPATIENT
Start: 2021-07-22 | End: 2021-07-22

## 2021-07-22 RX ORDER — LIDOCAINE 40 MG/G
CREAM TOPICAL ONCE
Status: CANCELLED | OUTPATIENT
Start: 2021-07-22 | End: 2021-07-22

## 2021-07-22 RX ORDER — BACITRACIN ZINC AND POLYMYXIN B SULFATE 500; 1000 [USP'U]/G; [USP'U]/G
OINTMENT TOPICAL ONCE
Status: CANCELLED | OUTPATIENT
Start: 2021-07-22 | End: 2021-07-22

## 2021-07-22 RX ORDER — LIDOCAINE HYDROCHLORIDE 40 MG/ML
SOLUTION TOPICAL ONCE
Status: CANCELLED | OUTPATIENT
Start: 2021-07-22 | End: 2021-07-22

## 2021-07-22 RX ORDER — BETAMETHASONE DIPROPIONATE 0.05 %
OINTMENT (GRAM) TOPICAL ONCE
Status: CANCELLED | OUTPATIENT
Start: 2021-07-22 | End: 2021-07-22

## 2021-07-22 RX ORDER — LIDOCAINE HYDROCHLORIDE 20 MG/ML
JELLY TOPICAL ONCE
Status: CANCELLED | OUTPATIENT
Start: 2021-07-22 | End: 2021-07-22

## 2021-07-22 RX ORDER — LIDOCAINE 50 MG/G
OINTMENT TOPICAL ONCE
Status: CANCELLED | OUTPATIENT
Start: 2021-07-22 | End: 2021-07-22

## 2021-07-22 RX ORDER — LIDOCAINE 50 MG/G
OINTMENT TOPICAL ONCE
Status: COMPLETED | OUTPATIENT
Start: 2021-07-22 | End: 2021-07-22

## 2021-07-22 RX ORDER — CLOBETASOL PROPIONATE 0.5 MG/G
OINTMENT TOPICAL ONCE
Status: CANCELLED | OUTPATIENT
Start: 2021-07-22 | End: 2021-07-22

## 2021-07-22 RX ORDER — GINSENG 100 MG
CAPSULE ORAL ONCE
Status: CANCELLED | OUTPATIENT
Start: 2021-07-22 | End: 2021-07-22

## 2021-07-22 RX ADMIN — LIDOCAINE: 50 OINTMENT TOPICAL at 10:29

## 2021-07-22 ASSESSMENT — PAIN SCALES - GENERAL: PAINLEVEL_OUTOF10: 0
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
## 2021-07-26 ENCOUNTER — HOSPITAL ENCOUNTER (OUTPATIENT)
Dept: WOUND CARE | Age: 56
Discharge: HOME | End: 2021-07-26
Payer: COMMERCIAL

## 2021-07-26 VITALS
HEIGHT: 72 IN | HEART RATE: 81 BPM | BODY MASS INDEX: 23.89 KG/M2 | DIASTOLIC BLOOD PRESSURE: 74 MMHG | WEIGHT: 176.4 LBS | RESPIRATION RATE: 18 BRPM | SYSTOLIC BLOOD PRESSURE: 123 MMHG | TEMPERATURE: 97.5 F

## 2021-07-26 DIAGNOSIS — L97.522 ULCER OF LEFT FOOT, WITH FAT LAYER EXPOSED (HCC): ICD-10-CM

## 2021-07-26 PROCEDURE — 99213 OFFICE O/P EST LOW 20 MIN: CPT

## 2021-07-26 PROCEDURE — 11042 DBRDMT SUBQ TIS 1ST 20SQCM/<: CPT

## 2021-07-26 RX ORDER — LIDOCAINE 40 MG/G
CREAM TOPICAL PRN
Status: DISCONTINUED | OUTPATIENT
Start: 2021-07-26 | End: 2021-07-27 | Stop reason: HOSPADM

## 2021-07-26 ASSESSMENT — PAIN SCALES - GENERAL: PAINLEVEL_OUTOF10: 0

## 2021-07-26 NOTE — PROGRESS NOTES
[facility] Progress Note      [facility]     : 1965    DATE OF VISIT:  2021    Subjective:     Curtis Marc is a 64 y.o. male who has a chief complaint of a diabetic ulcer located on the left foot. Significant symptoms or pertinent ulcer history since last visit: Has been seeing Dr. Wilhemenia Skiff but moved closer to our facility so following up here. Seen with his daughter. Mr. Art Hart has a past medical history of Age-related cataract of left eye, Arthritis of knee, right, Broken teeth, Chronic low back pain, Colon polyps, Coronary artery arteriosclerosis, Dementia ([address] Depression, Diabetic neuropathy ([address] Factor V Leiden ([address] Frozen shoulder syndrome, History of blood transfusion, Hx of blood clots, Hyperlipidemia, Hypertension, Kidney stones, Nasal lesion, Nocardia infection, Sleep apnea, Type II or unspecified type diabetes mellitus without mention of complication, not stated as uncontrolled, and Vitreous hemorrhage, right eye ([address] He has a past surgical history that includes Uvulectomy; Tonsillectomy and adenoidectomy; Mouth surgery; shoulder surgery (); Shoulder arthroscopy (Left, 2015); Knee cartilage surgery (Right); Ureter stent placement (Right, ); Colonoscopy (10/27/2017); Colonoscopy (2018); eye surgery; Colonoscopy (N/A, 2020); and Colonoscopy (2020). His family history includes Arthritis in his father; Cancer in his father and mother; Heart Disease in his father; Other in his mother. Mr. Art Hart reports that he has been smoking cigarettes. He started smoking about 41 years ago. He has a 20.00 pack-year smoking history. He has never used smokeless tobacco. He reports previous alcohol use. He reports that he does not use drugs.     His current medication list consists of Cinnamon, Glucosamine HCl, HYDROcodone-acetaminophen, Melatonin, Multiple Vitamin, Omega-3, aspirin, atorvastatin, bismuth subsalicylate, blood glucose test strips, glipiZIDE, metFORMIN, and naproxen. Allergies: Effexor xr [venlafaxine hcl er], Invokana [canagliflozin], Lorazepam, Penicillins, and Wilhemenia Moulds [insulin glargine]    Pertinent items from the review of systems are discussed in the HPI; the remainder of the ROS was reviewed and is negative. Denies nausea, vomiting, fevers, chills, shortness of breath or chest pain. Objective:     /74   Pulse 81   Temp 97.5 °F (36.4 °C) (Oral)   Resp 18   Ht 6' (1.829 m)   Wt 176 lb 6.4 oz (80 kg)   BMI 23.92 kg/m²      General Appearance: alert and oriented to person, place and time, well developed and well- nourished, in no acute distress  Skin: warm and dry, no rash or erythema  Head: normocephalic and atraumatic  Eyes: pupils equal, round, and reactive to light, extraocular eye movements intact, conjunctivae normal  ENT: tympanic membrane, external ear and ear canal normal bilaterally, nose without deformity, nasal mucosa and turbinates normal without polyps  Neck: supple and non-tender without mass, no thyromegaly or thyroid nodules, no cervical lymphadenopathy  Pulmonary/Chest: clear to auscultation bilaterally- no wheezes, rales or rhonchi, normal air movement, no respiratory distress  Cardiovascular: normal rate, regular rhythm, normal S1 and S2, no murmurs, rubs, clicks, or gallops, distal pulses intact, no carotid bruits  Abdomen: soft, non-tender, non-distended, normal bowel sounds, no masses or organomegaly.     Dorsalis pedis pulse left palpable  Posterior tibial pulse left palpable  Dorsalis pedis pulse right palpable  Posterior tibial pulse right palpable      Ulcer on the distal aspect left hallux with mild fibrotic tissue, red granulation tissue, mild serous drainage, mild hyperkeratotic rim, no undermining, no tunneling, no purulence, no malodor, no eschar, no periwound maceration, mild periwound erythema, mild edema, no crepitus, no increase in skin temperature, ulcer Surface Area Debrided: 1 sq cm. The ulcer(s) were then irrigated with normal saline solution. The procedure was completed with a small amount of bleeding, and hemostasis was by pressure. The patient tolerated the procedure well, with no significant complications. The patient's level of pain during and after the procedure was monitored, and is noted in the wound documentation flowsheet. Discharge plan:     Treatment in the wound care center today: Wound 07/26/21 #1, Left Great Toe, Diabetic Ulcer, Onset 6/1/21-Dressing/Treatment:  (PSO, dry dressing). Home treatment: good handwashing before and after any dressing changes. Cleanse ulcer with saline or soap & water before dressing change. May use Vaseline (petrolatum), Aquaphor, Aveeno, CeraVe, Cetaphil, Eucerin, Lubriderm, etc for dry skin. Dressing type for home: Medihoney and dry dressing daily. Written discharge instructions given to patient. Offload ulcer(s) as directed. Elevate leg(s) as directed. Follow up in 1 week. Control glucose levels to prevent future complications. Reviewed notes from Dr. Kyle Graf. Wound appears to be improving well. At least 50% improvement this past week which is encouraging.             Electronically signed by Sherol Scheuermann, DPM on 7/26/2021 at 4:51 PM.

## 2021-07-26 NOTE — PLAN OF CARE
Pt to the [facility] for initial appointment. Wound debrided today per Dr Robi Gamez. Pt to continue with medihoney and dry dressing to wound daily. Pt to follow up in the [facility] in 1 week. Discharge instructions reviewed with patient, all questions answered, copy given to patient. Dressings were applied to all wounds per M.D. Instructions at this visit.

## 2021-08-02 ENCOUNTER — HOSPITAL ENCOUNTER (OUTPATIENT)
Dept: WOUND CARE | Age: 56
Discharge: HOME | End: 2021-08-02
Payer: COMMERCIAL

## 2021-08-02 VITALS
TEMPERATURE: 97.3 F | RESPIRATION RATE: 20 BRPM | HEART RATE: 68 BPM | BODY MASS INDEX: 24.11 KG/M2 | SYSTOLIC BLOOD PRESSURE: 137 MMHG | DIASTOLIC BLOOD PRESSURE: 77 MMHG | WEIGHT: 178 LBS | HEIGHT: 72 IN

## 2021-08-02 DIAGNOSIS — L97.522 ULCER OF LEFT FOOT, WITH FAT LAYER EXPOSED (HCC): Primary | ICD-10-CM

## 2021-08-02 PROCEDURE — 11042 DBRDMT SUBQ TIS 1ST 20SQCM/<: CPT

## 2021-08-02 RX ORDER — BACITRACIN ZINC AND POLYMYXIN B SULFATE 500; 1000 [USP'U]/G; [USP'U]/G
OINTMENT TOPICAL ONCE
Status: CANCELLED | OUTPATIENT
Start: 2021-08-02 | End: 2021-08-02

## 2021-08-02 RX ORDER — LIDOCAINE HYDROCHLORIDE 40 MG/ML
SOLUTION TOPICAL ONCE
Status: CANCELLED | OUTPATIENT
Start: 2021-08-02 | End: 2021-08-02

## 2021-08-02 RX ORDER — NICOTINE 21 MG/24HR
1 PATCH, TRANSDERMAL 24 HOURS TRANSDERMAL EVERY 24 HOURS
COMMUNITY

## 2021-08-02 RX ORDER — LIDOCAINE 40 MG/G
CREAM TOPICAL ONCE
Status: CANCELLED | OUTPATIENT
Start: 2021-08-02 | End: 2021-08-02

## 2021-08-02 RX ORDER — LIDOCAINE 40 MG/G
CREAM TOPICAL ONCE
Status: DISCONTINUED | OUTPATIENT
Start: 2021-08-02 | End: 2021-08-03 | Stop reason: HOSPADM

## 2021-08-02 RX ORDER — LIDOCAINE 50 MG/G
OINTMENT TOPICAL ONCE
Status: CANCELLED | OUTPATIENT
Start: 2021-08-02 | End: 2021-08-02

## 2021-08-02 ASSESSMENT — PAIN SCALES - GENERAL: PAINLEVEL_OUTOF10: 0

## 2021-08-02 NOTE — PLAN OF CARE
Pt to the [address] [place] [address],[address] for follow up appointment. Wound measuring smaller. Pt to continue with medihoney and dry dressing to wound. Instructed patient to not get in the pool at this time. Pt to follow up in the [address] [place] [address],[address] in 1 week. Discharge instructions reviewed with patient, all questions answered, copy given to patient. Dressings were applied to all wounds per M.D. Instructions at this visit.

## 2021-08-09 ENCOUNTER — HOSPITAL ENCOUNTER (OUTPATIENT)
Dept: WOUND CARE | Age: 56
Discharge: HOME | End: 2021-08-09
Payer: COMMERCIAL

## 2021-08-09 VITALS
BODY MASS INDEX: 24.24 KG/M2 | DIASTOLIC BLOOD PRESSURE: 84 MMHG | WEIGHT: 179 LBS | RESPIRATION RATE: 20 BRPM | TEMPERATURE: 98.1 F | SYSTOLIC BLOOD PRESSURE: 136 MMHG | HEART RATE: 71 BPM | HEIGHT: 72 IN

## 2021-08-09 DIAGNOSIS — L97.522 ULCER OF LEFT FOOT, WITH FAT LAYER EXPOSED (HCC): Primary | ICD-10-CM

## 2021-08-09 PROCEDURE — 11042 DBRDMT SUBQ TIS 1ST 20SQCM/<: CPT

## 2021-08-09 RX ORDER — LIDOCAINE 50 MG/G
OINTMENT TOPICAL ONCE
OUTPATIENT
Start: 2021-08-09 | End: 2021-08-09

## 2021-08-09 RX ORDER — LIDOCAINE 40 MG/G
CREAM TOPICAL ONCE
Status: DISCONTINUED | OUTPATIENT
Start: 2021-08-09 | End: 2021-08-10 | Stop reason: HOSPADM

## 2021-08-09 RX ORDER — LIDOCAINE 40 MG/G
CREAM TOPICAL ONCE
Status: CANCELLED | OUTPATIENT
Start: 2021-08-09 | End: 2021-08-09

## 2021-08-09 RX ORDER — BACITRACIN ZINC AND POLYMYXIN B SULFATE 500; 1000 [USP'U]/G; [USP'U]/G
OINTMENT TOPICAL ONCE
OUTPATIENT
Start: 2021-08-09 | End: 2021-08-09

## 2021-08-09 RX ORDER — LIDOCAINE HYDROCHLORIDE 40 MG/ML
SOLUTION TOPICAL ONCE
OUTPATIENT
Start: 2021-08-09 | End: 2021-08-09

## 2021-08-09 ASSESSMENT — PAIN SCALES - GENERAL: PAINLEVEL_OUTOF10: 0

## 2021-08-09 NOTE — PLAN OF CARE
Pt to the [address] [place] [address],[address] [place] [address] for follow up appointment. Wound debrided today per Dr Mavis Salgado. Pt to continue with Medihoney and dry dressing to wound. Pt to follow  up in the [address] [place] [address] in 1 week. Discharge instructions reviewed with patient, all questions answered, copy given to patient. Dressings were applied to all wounds per M.D. Instructions at this visit.

## 2021-08-16 ENCOUNTER — HOSPITAL ENCOUNTER (OUTPATIENT)
Dept: WOUND CARE | Age: 56
Discharge: HOME | End: 2021-08-16
Payer: COMMERCIAL

## 2021-08-16 VITALS
DIASTOLIC BLOOD PRESSURE: 80 MMHG | HEIGHT: 72 IN | TEMPERATURE: 97 F | BODY MASS INDEX: 24.52 KG/M2 | RESPIRATION RATE: 18 BRPM | HEART RATE: 72 BPM | SYSTOLIC BLOOD PRESSURE: 143 MMHG | WEIGHT: 181 LBS

## 2021-08-16 DIAGNOSIS — L97.522 ULCER OF LEFT FOOT, WITH FAT LAYER EXPOSED (HCC): Primary | ICD-10-CM

## 2021-08-16 PROCEDURE — 99212 OFFICE O/P EST SF 10 MIN: CPT

## 2021-08-16 RX ORDER — LIDOCAINE 40 MG/G
CREAM TOPICAL ONCE
Status: DISCONTINUED | OUTPATIENT
Start: 2021-08-16 | End: 2021-08-17 | Stop reason: HOSPADM

## 2021-08-16 RX ORDER — BACITRACIN ZINC AND POLYMYXIN B SULFATE 500; 1000 [USP'U]/G; [USP'U]/G
OINTMENT TOPICAL ONCE
OUTPATIENT
Start: 2021-08-16 | End: 2021-08-16

## 2021-08-16 RX ORDER — LIDOCAINE HYDROCHLORIDE 40 MG/ML
SOLUTION TOPICAL ONCE
OUTPATIENT
Start: 2021-08-16 | End: 2021-08-16

## 2021-08-16 RX ORDER — LIDOCAINE 40 MG/G
CREAM TOPICAL ONCE
OUTPATIENT
Start: 2021-08-16 | End: 2021-08-16

## 2021-08-16 RX ORDER — LIDOCAINE 50 MG/G
OINTMENT TOPICAL ONCE
OUTPATIENT
Start: 2021-08-16 | End: 2021-08-16

## 2021-08-16 ASSESSMENT — PAIN SCALES - GENERAL: PAINLEVEL_OUTOF10: 0

## 2021-08-16 NOTE — PROGRESS NOTES
hcl er], Invokana [canagliflozin], Lorazepam, Penicillins, and Scarlett Coup [insulin glargine]    Pertinent items from the review of systems are discussed in the HPI; the remainder of the ROS was reviewed and is negative. Denies nausea, vomiting, fevers, chills, shortness of breath or chest pain. Objective:     BP (!) 143/80   Pulse 72   Temp 97 °F (36.1 °C) (Oral)   Resp 18   Ht 6' (1.829 m)   Wt 181 lb (82.1 kg)   BMI 24.55 kg/m²      General Appearance: alert and oriented to person, place and time, well developed and well- nourished, in no acute distress  Skin: warm and dry, no rash or erythema  Head: normocephalic and atraumatic  Eyes: pupils equal, round, and reactive to light, extraocular eye movements intact, conjunctivae normal  ENT: tympanic membrane, external ear and ear canal normal bilaterally, nose without deformity, nasal mucosa and turbinates normal without polyps  Neck: supple and non-tender without mass, no thyromegaly or thyroid nodules, no cervical lymphadenopathy  Pulmonary/Chest: clear to auscultation bilaterally- no wheezes, rales or rhonchi, normal air movement, no respiratory distress  Cardiovascular: normal rate, regular rhythm, normal S1 and S2, no murmurs, rubs, clicks, or gallops, distal pulses intact, no carotid bruits  Abdomen: soft, non-tender, non-distended, normal bowel sounds, no masses or organomegaly. Dorsalis pedis pulse left palpable  Posterior tibial pulse left palpable  Dorsalis pedis pulse right palpable  Posterior tibial pulse right palpable      Ulcer on the distal aspect left hallux epithelialized. Pre-debridement ulcer measurements are in the wound documentation flowsheet.     Post  Debridement Measurements:  Today's wound measurements:  Wound 07/26/21 #1, Left Great Toe, Diabetic Ulcer, Onset 6/1/21-Wound Length (cm): 0 cm    Wound 07/26/21 #1, Left Great Toe, Diabetic Ulcer, Onset 6/1/21-Wound Width (cm): 0 cm    Wound 07/26/21 #1, Left Great Toe, Diabetic Ulcer, Onset 6/1/21-Wound Depth (cm): 0 cm    Pain Control: Anesthetic  Anesthetic: 4% Lidocaine Cream         LABS  Lab Results   Component Value Date    LABA1C 7.5 05/25/2021         Assessment:     Patient Active Problem List   Diagnosis Code    Sleep apnea G47.30    ED (erectile dysfunction) N52.9    Hypercholesterolemia E78.00    Anxiety F41.9    HTN (hypertension) I10    DM (diabetes mellitus), type 2 ([address] E11.9    Chronic low back pain M54.5, G89.29    Factor V Leiden ([address] D68.51    Nasal lesion J34.89    Ulcer of right foot ([address] L97.519    Arthritis of knee, right M17.11    Blisters of multiple sites-right big toe and 2nd toe R23.8    Coronary artery arteriosclerosis I25.10    Diabetic neuropathy (HCC) E11.40    Colon polyps K63.5    Age-related cataract of left eye H25.9    Ulcer of left foot, with fat layer exposed ([address] L97.522       Assessment of today's active condition(s): diabetic foot ulcer, diabetes mellitus with peripheral neuropathy. Factors contributing to occurrence and/or persistence of the chronic ulcer include diabetes. Sharp debridement is not indicated today, based upon the exam findings in the ulcer(s) above. Discharge plan:     Treatment in the wound care center today:  . Home treatment: good handwashing before and after any dressing changes. Cleanse ulcer with saline or soap & water before dressing change. May use Vaseline (petrolatum), Aquaphor, Aveeno, CeraVe, Cetaphil, Eucerin, Lubriderm, etc for dry skin. Dressing type for home: Medihoney and dry dressing daily for the next week to allow skin to strengthen. Written discharge instructions given to patient. Offload ulcer(s) as directed. Elevate leg(s) as directed. Follow up in [facility] as needed. Control glucose levels to prevent future complications.              Electronically signed by Mary Kay Sloan DPM on 8/16/2021 at 2:34 PM.

## 2021-08-16 NOTE — PLAN OF CARE
Pt to the [address] [place] [address] for follow up appointment. Wound healed. Pt to continue to apply dressing for 1 more week. Pt to follow up in the [address] [place] [address],[address] as needed. Discharge instructions reviewed with patient and daughter, all questions answered, copy given to patient. Dressings were applied to all wounds per M.D. Instructions at this visit.

## 2021-08-16 NOTE — PROGRESS NOTES
[facility] Progress Note      [facility]     : 1965    DATE OF VISIT:  2021    Subjective:     Phyllis Diaz is a 64 y.o. male who has a chief complaint of a diabetic ulcer located on the left foot. Significant symptoms or pertinent ulcer history since last visit: No issues with his wound. Mr. Giancarlo Ernandez has a past medical history of Age-related cataract of left eye, Arthritis of knee, right, Broken teeth, Chronic low back pain, Colon polyps, Coronary artery arteriosclerosis, Dementia ([address] Depression, Diabetic neuropathy ([address] Factor V Leiden ([address] Frozen shoulder syndrome, History of blood transfusion, Hx of blood clots, Hyperlipidemia, Hypertension, Kidney stones, Nasal lesion, Nocardia infection, Sleep apnea, Type II or unspecified type diabetes mellitus without mention of complication, not stated as uncontrolled, and Vitreous hemorrhage, right eye ([address] He has a past surgical history that includes Uvulectomy; Tonsillectomy and adenoidectomy; Mouth surgery; shoulder surgery (); Shoulder arthroscopy (Left, 2015); Knee cartilage surgery (Right); Ureter stent placement (Right, ); Colonoscopy (10/27/2017); Colonoscopy (2018); eye surgery; Colonoscopy (N/A, 2020); and Colonoscopy (2020). His family history includes Arthritis in his father; Cancer in his father and mother; Heart Disease in his father; Other in his mother. Mr. Giancarlo Ernandez reports that he has been smoking cigarettes. He started smoking about 41 years ago. He has a 20.00 pack-year smoking history. He has never used smokeless tobacco. He reports previous alcohol use. He reports that he does not use drugs. His current medication list consists of Cinnamon, Glucosamine HCl, Melatonin, Multiple Vitamin, Omega-3, aspirin, atorvastatin, bismuth subsalicylate, blood glucose test strips, glipiZIDE, metFORMIN, naproxen, and nicotine.     Allergies: Effexor xr [venlafaxine hcl er], Invokana [canagliflozin], Lorazepam, Penicillins, and Marivel Bilberry [insulin glargine]    Pertinent items from the review of systems are discussed in the HPI; the remainder of the ROS was reviewed and is negative. Denies nausea, vomiting, fevers, chills, shortness of breath or chest pain. Objective:     /84   Pulse 71   Temp 98.1 °F (36.7 °C) (Oral)   Resp 20   Ht 6' (1.829 m)   Wt 179 lb (81.2 kg)   BMI 24.28 kg/m²      General Appearance: alert and oriented to person, place and time, well developed and well- nourished, in no acute distress  Skin: warm and dry, no rash or erythema  Head: normocephalic and atraumatic  Eyes: pupils equal, round, and reactive to light, extraocular eye movements intact, conjunctivae normal  ENT: tympanic membrane, external ear and ear canal normal bilaterally, nose without deformity, nasal mucosa and turbinates normal without polyps  Neck: supple and non-tender without mass, no thyromegaly or thyroid nodules, no cervical lymphadenopathy  Pulmonary/Chest: clear to auscultation bilaterally- no wheezes, rales or rhonchi, normal air movement, no respiratory distress  Cardiovascular: normal rate, regular rhythm, normal S1 and S2, no murmurs, rubs, clicks, or gallops, distal pulses intact, no carotid bruits  Abdomen: soft, non-tender, non-distended, normal bowel sounds, no masses or organomegaly.     Dorsalis pedis pulse left palpable  Posterior tibial pulse left palpable  Dorsalis pedis pulse right palpable  Posterior tibial pulse right palpable      Ulcer on the distal aspect left hallux with mild fibrotic tissue, red granulation tissue, mild serous drainage, mild hyperkeratotic rim, no undermining, no tunneling, no purulence, no malodor, no eschar, no periwound maceration, mild periwound erythema, mild edema, no crepitus, no increase in skin temperature, ulcer probes to soft tissue only      Pre-debridement ulcer measurements are in the wound documentation flowsheet. Post  Debridement Measurements:  Today's wound measurements:  Wound 07/26/21 #1, Left Great Toe, Diabetic Ulcer, Onset 6/1/21-Wound Length (cm): 0.2 cm    Wound 07/26/21 #1, Left Great Toe, Diabetic Ulcer, Onset 6/1/21-Wound Width (cm): 0.2 cm    Wound 07/26/21 #1, Left Great Toe, Diabetic Ulcer, Onset 6/1/21-Wound Depth (cm): 0.2 cm    Pain Control: Anesthetic  Anesthetic: 4% Lidocaine Cream         LABS  Lab Results   Component Value Date    LABA1C 7.5 05/25/2021         Assessment:     Patient Active Problem List   Diagnosis Code    Sleep apnea G47.30    ED (erectile dysfunction) N52.9    Hypercholesterolemia E78.00    Anxiety F41.9    HTN (hypertension) I10    DM (diabetes mellitus), type 2 ([address] E11.9    Chronic low back pain M54.5, G89.29    Factor V Leiden ([address] D68.51    Nasal lesion J34.89    Ulcer of right foot ([address] L97.519    Arthritis of knee, right M17.11    Blisters of multiple sites-right big toe and 2nd toe R23.8    Coronary artery arteriosclerosis I25.10    Diabetic neuropathy (HCC) E11.40    Colon polyps K63.5    Age-related cataract of left eye H25.9    Ulcer of left foot, with fat layer exposed ([address] L97.522       Assessment of today's active condition(s): diabetic foot ulcer, diabetes mellitus with peripheral neuropathy. Factors contributing to occurrence and/or persistence of the chronic ulcer include diabetes. Sharp debridement is indicated today, based upon the exam findings in the ulcer(s) above. Procedure note:     Consent obtained. Time out taken. Anesthetic  Anesthetic: 4% Lidocaine Cream     After application of topical 4% lidocaine plain to the ulcer, the ulcer was debrided of all fibrotic, necrotic, hyperkeratotic tissue, nonviable and viable tissue through the subcutaneous tissue. This excised skin and subcutaneous tissue with a scalpel. Total Surface Area Debrided: 1 sq cm. The ulcer(s) were then irrigated with normal saline solution.  The procedure was completed with a small amount of bleeding, and hemostasis was by pressure. The patient tolerated the procedure well, with no significant complications. The patient's level of pain during and after the procedure was monitored, and is noted in the wound documentation flowsheet. Discharge plan:     Treatment in the wound care center today: Wound 07/26/21 #1, Left Great Toe, Diabetic Ulcer, Onset 6/1/21-Dressing/Treatment:  (PSO/ gauze,tape). Home treatment: good handwashing before and after any dressing changes. Cleanse ulcer with saline or soap & water before dressing change. May use Vaseline (petrolatum), Aquaphor, Aveeno, CeraVe, Cetaphil, Eucerin, Lubriderm, etc for dry skin. Dressing type for home: Medihoney and dry dressing daily. Written discharge instructions given to patient. Offload ulcer(s) as directed. Elevate leg(s) as directed. Follow up in 1 week. Control glucose levels to prevent future complications.              Electronically signed by Kayla Flowers DPM on 8/16/2021 at 12:23 PM.

## 2021-09-02 NOTE — PROGRESS NOTES
[address] [facility] Progress Note      Arnulfo Small     : 1965    DATE OF VISIT:  2021    Subjective:     Nina Stephens is a 64 y.o. male who has a chief complaint of a diabetic ulcer located on the left foot. Significant symptoms or pertinent ulcer history since last visit: Doing well. Wants to get in pool. Mr. Margarita Oscar has a past medical history of Age-related cataract of left eye, Arthritis of knee, right, Broken teeth, Chronic low back pain, Colon polyps, Coronary artery arteriosclerosis, Dementia ([address] Depression, Diabetic neuropathy ([address] Factor V Leiden ([address] Frozen shoulder syndrome, History of blood transfusion, Hx of blood clots, Hyperlipidemia, Hypertension, Kidney stones, Nasal lesion, Nocardia infection, Sleep apnea, Type II or unspecified type diabetes mellitus without mention of complication, not stated as uncontrolled, and Vitreous hemorrhage, right eye ([address] He has a past surgical history that includes Uvulectomy; Tonsillectomy and adenoidectomy; Mouth surgery; shoulder surgery (); Shoulder arthroscopy (Left, 2015); Knee cartilage surgery (Right); Ureter stent placement (Right, ); Colonoscopy (10/27/2017); Colonoscopy (2018); eye surgery; Colonoscopy (N/A, 2020); and Colonoscopy (2020). His family history includes Arthritis in his father; Cancer in his father and mother; Heart Disease in his father; Other in his mother. Mr. Maragrita Oscar reports that he has been smoking cigarettes. He started smoking about 41 years ago. He has a 20.00 pack-year smoking history. He has never used smokeless tobacco. He reports previous alcohol use. He reports that he does not use drugs. His current medication list consists of Cinnamon, Glucosamine HCl, Melatonin, Multiple Vitamin, Omega-3, aspirin, atorvastatin, bismuth subsalicylate, blood glucose test strips, glipiZIDE, metFORMIN, naproxen, and nicotine.     Allergies: Effexor xr [venlafaxine hcl er], Invokana [canagliflozin], Lorazepam, Penicillins, and Turkish Bennetts [insulin glargine]    Pertinent items from the review of systems are discussed in the HPI; the remainder of the ROS was reviewed and is negative. Denies nausea, vomiting, fevers, chills, shortness of breath or chest pain. Objective:     /77   Pulse 68   Temp 97.3 °F (36.3 °C) (Oral)   Resp 20   Ht 6' (1.829 m)   Wt 178 lb (80.7 kg)   BMI 24.14 kg/m²      General Appearance: alert and oriented to person, place and time, well developed and well- nourished, in no acute distress  Skin: warm and dry, no rash or erythema  Head: normocephalic and atraumatic  Eyes: pupils equal, round, and reactive to light, extraocular eye movements intact, conjunctivae normal  ENT: tympanic membrane, external ear and ear canal normal bilaterally, nose without deformity, nasal mucosa and turbinates normal without polyps  Neck: supple and non-tender without mass, no thyromegaly or thyroid nodules, no cervical lymphadenopathy  Pulmonary/Chest: clear to auscultation bilaterally- no wheezes, rales or rhonchi, normal air movement, no respiratory distress  Cardiovascular: normal rate, regular rhythm, normal S1 and S2, no murmurs, rubs, clicks, or gallops, distal pulses intact, no carotid bruits  Abdomen: soft, non-tender, non-distended, normal bowel sounds, no masses or organomegaly.     Dorsalis pedis pulse left palpable  Posterior tibial pulse left palpable  Dorsalis pedis pulse right palpable  Posterior tibial pulse right palpable      Ulcer on the distal aspect left hallux with mild fibrotic tissue, red granulation tissue, mild serous drainage, mild hyperkeratotic rim, no undermining, no tunneling, no purulence, no malodor, no eschar, no periwound maceration, mild periwound erythema, mild edema, no crepitus, no increase in skin temperature, ulcer probes to soft tissue only         Pre-debridement ulcer measurements are in the wound documentation flowsheet. Post  Debridement Measurements:  Today's wound measurements:  [REMOVED] Wound 07/26/21 #1, Left Great Toe, Diabetic Ulcer, Onset 6/1/21-Wound Length (cm): 0.2 cm    [REMOVED] Wound 07/26/21 #1, Left Great Toe, Diabetic Ulcer, Onset 6/1/21-Wound Width (cm): 0.4 cm    [REMOVED] Wound 07/26/21 #1, Left Great Toe, Diabetic Ulcer, Onset 6/1/21-Wound Depth (cm): 0.1 cm    Pain Control: Anesthetic  Anesthetic: 4% Lidocaine Cream         LABS  Lab Results   Component Value Date    LABA1C 7.5 05/25/2021         Assessment:     Patient Active Problem List   Diagnosis Code    Sleep apnea G47.30    ED (erectile dysfunction) N52.9    Hypercholesterolemia E78.00    Anxiety F41.9    HTN (hypertension) I10    DM (diabetes mellitus), type 2 ([address] E11.9    Chronic low back pain M54.5, G89.29    Factor V Leiden ([address] D68.51    Nasal lesion J34.89    Ulcer of right foot ([address] L97.519    Arthritis of knee, right M17.11    Blisters of multiple sites-right big toe and 2nd toe R23.8    Coronary artery arteriosclerosis I25.10    Diabetic neuropathy (HCC) E11.40    Colon polyps K63.5    Age-related cataract of left eye H25.9    Ulcer of left foot, with fat layer exposed ([address] L97.522       Assessment of today's active condition(s): diabetic foot ulcer, diabetes mellitus with peripheral neuropathy. Factors contributing to occurrence and/or persistence of the chronic ulcer include diabetes. Sharp debridement is indicated today, based upon the exam findings in the ulcer(s) above. Procedure note:     Consent obtained. Time out taken. Anesthetic  Anesthetic: 4% Lidocaine Cream     After application of topical 4% lidocaine plain to the ulcer, the ulcer was debrided of all fibrotic, necrotic, hyperkeratotic tissue, nonviable and viable tissue through the subcutaneous tissue. This excised skin and subcutaneous tissue with a scalpel. Total Surface Area Debrided: 1 sq cm.     The ulcer(s) were then irrigated with normal saline solution. The procedure was completed with a small amount of bleeding, and hemostasis was by pressure. The patient tolerated the procedure well, with no significant complications. The patient's level of pain during and after the procedure was monitored, and is noted in the wound documentation flowsheet. Discharge plan:     Treatment in the wound care center today: [REMOVED] Wound 07/26/21 #1, Left Great Toe, Diabetic Ulcer, Onset 6/1/21-Dressing/Treatment: Other (comment) (PSO,dry dressing ). Home treatment: good handwashing before and after any dressing changes. Cleanse ulcer with saline or soap & water before dressing change. May use Vaseline (petrolatum), Aquaphor, Aveeno, CeraVe, Cetaphil, Eucerin, Lubriderm, etc for dry skin. Dressing type for home: Medihoney and dry dressing daily. Written discharge instructions given to patient. Offload ulcer(s) as directed. Elevate leg(s) as directed. Follow up in 1 week. Control glucose levels to prevent future complications. Stay out of pool.        Electronically signed by Ema Olmstead DPM on 9/2/2021 at 3:15 PM.

## 2021-10-13 ENCOUNTER — APPOINTMENT (OUTPATIENT)
Dept: NUCLEAR MEDICINE | Age: 56
End: 2021-10-13
Payer: COMMERCIAL

## 2021-10-13 ENCOUNTER — HOSPITAL ENCOUNTER (OUTPATIENT)
Age: 56
Setting detail: OBSERVATION
Discharge: HOME | End: 2021-10-13
Attending: STUDENT IN AN ORGANIZED HEALTH CARE EDUCATION/TRAINING PROGRAM | Admitting: INTERNAL MEDICINE
Payer: COMMERCIAL

## 2021-10-13 ENCOUNTER — APPOINTMENT (OUTPATIENT)
Dept: GENERAL RADIOLOGY | Age: 56
End: 2021-10-13
Payer: COMMERCIAL

## 2021-10-13 VITALS
HEIGHT: 72 IN | TEMPERATURE: 97.8 F | BODY MASS INDEX: 23.84 KG/M2 | HEART RATE: 66 BPM | RESPIRATION RATE: 15 BRPM | OXYGEN SATURATION: 98 % | WEIGHT: 176 LBS | DIASTOLIC BLOOD PRESSURE: 76 MMHG | SYSTOLIC BLOOD PRESSURE: 136 MMHG

## 2021-10-13 DIAGNOSIS — R07.9 CHEST PAIN, UNSPECIFIED TYPE: Primary | ICD-10-CM

## 2021-10-13 LAB
A/G RATIO: 1.9 (ref 1.1–2.2)
ALBUMIN SERPL-MCNC: 4.6 G/DL (ref 3.4–5)
ALP BLD-CCNC: 86 U/L (ref 40–129)
ALT SERPL-CCNC: 19 U/L (ref 10–40)
ANION GAP SERPL CALCULATED.3IONS-SCNC: 18 MMOL/L (ref 3–16)
ANION GAP SERPL CALCULATED.3IONS-SCNC: 6 MMOL/L (ref 3–16)
AST SERPL-CCNC: 20 U/L (ref 15–37)
BASOPHILS ABSOLUTE: 0.3 K/UL (ref 0–0.2)
BASOPHILS RELATIVE PERCENT: 3.1 %
BILIRUB SERPL-MCNC: 0.8 MG/DL (ref 0–1)
BUN BLDV-MCNC: 14 MG/DL (ref 7–20)
BUN BLDV-MCNC: 15 MG/DL (ref 7–20)
CALCIUM SERPL-MCNC: 10 MG/DL (ref 8.3–10.6)
CALCIUM SERPL-MCNC: 9.8 MG/DL (ref 8.3–10.6)
CHLORIDE BLD-SCNC: 97 MMOL/L (ref 99–110)
CHLORIDE BLD-SCNC: 97 MMOL/L (ref 99–110)
CHOLESTEROL, TOTAL: 119 MG/DL (ref 0–199)
CO2: 21 MMOL/L (ref 21–32)
CO2: 29 MMOL/L (ref 21–32)
CREAT SERPL-MCNC: 0.6 MG/DL (ref 0.9–1.3)
CREAT SERPL-MCNC: 0.7 MG/DL (ref 0.9–1.3)
D DIMER: <200 NG/ML DDU (ref 0–229)
EKG ATRIAL RATE: 64 BPM
EKG ATRIAL RATE: 67 BPM
EKG DIAGNOSIS: NORMAL
EKG DIAGNOSIS: NORMAL
EKG P AXIS: 27 DEGREES
EKG P AXIS: 27 DEGREES
EKG P-R INTERVAL: 132 MS
EKG P-R INTERVAL: 144 MS
EKG Q-T INTERVAL: 372 MS
EKG Q-T INTERVAL: 414 MS
EKG QRS DURATION: 86 MS
EKG QRS DURATION: 92 MS
EKG QTC CALCULATION (BAZETT): 393 MS
EKG QTC CALCULATION (BAZETT): 427 MS
EKG R AXIS: 70 DEGREES
EKG R AXIS: 74 DEGREES
EKG T AXIS: 42 DEGREES
EKG T AXIS: 53 DEGREES
EKG VENTRICULAR RATE: 64 BPM
EKG VENTRICULAR RATE: 67 BPM
EOSINOPHILS ABSOLUTE: 0.3 K/UL (ref 0–0.6)
EOSINOPHILS RELATIVE PERCENT: 2.6 %
GFR AFRICAN AMERICAN: >60
GFR AFRICAN AMERICAN: >60
GFR NON-AFRICAN AMERICAN: >60
GFR NON-AFRICAN AMERICAN: >60
GLOBULIN: 2.4 G/DL
GLUCOSE BLD-MCNC: 128 MG/DL (ref 70–99)
GLUCOSE BLD-MCNC: 147 MG/DL (ref 70–99)
GLUCOSE BLD-MCNC: 171 MG/DL (ref 70–99)
GLUCOSE BLD-MCNC: 203 MG/DL (ref 70–99)
HCT VFR BLD CALC: 43.7 % (ref 40.5–52.5)
HDLC SERPL-MCNC: 54 MG/DL (ref 40–60)
HEMOGLOBIN: 14.8 G/DL (ref 13.5–17.5)
LDL CHOLESTEROL CALCULATED: 48 MG/DL
LIPASE: 17 U/L (ref 13–60)
LV EF: 55 %
LV EF: 58 %
LVEF MODALITY: NORMAL
LVEF MODALITY: NORMAL
LYMPHOCYTES ABSOLUTE: 1.3 K/UL (ref 1–5.1)
LYMPHOCYTES RELATIVE PERCENT: 12.4 %
MCH RBC QN AUTO: 32.4 PG (ref 26–34)
MCHC RBC AUTO-ENTMCNC: 33.8 G/DL (ref 31–36)
MCV RBC AUTO: 95.7 FL (ref 80–100)
MONOCYTES ABSOLUTE: 0.6 K/UL (ref 0–1.3)
MONOCYTES RELATIVE PERCENT: 5.9 %
NEUTROPHILS ABSOLUTE: 8.3 K/UL (ref 1.7–7.7)
NEUTROPHILS RELATIVE PERCENT: 76 %
PDW BLD-RTO: 13.1 % (ref 12.4–15.4)
PERFORMED ON: ABNORMAL
PERFORMED ON: ABNORMAL
PLATELET # BLD: 225 K/UL (ref 135–450)
PMV BLD AUTO: 8.5 FL (ref 5–10.5)
POTASSIUM REFLEX MAGNESIUM: 3.9 MMOL/L (ref 3.5–5.1)
POTASSIUM REFLEX MAGNESIUM: 3.9 MMOL/L (ref 3.5–5.1)
PRO-BNP: 43 PG/ML (ref 0–124)
RBC # BLD: 4.57 M/UL (ref 4.2–5.9)
SARS-COV-2, NAAT: NOT DETECTED
SODIUM BLD-SCNC: 132 MMOL/L (ref 136–145)
SODIUM BLD-SCNC: 136 MMOL/L (ref 136–145)
TOTAL PROTEIN: 7 G/DL (ref 6.4–8.2)
TRIGL SERPL-MCNC: 84 MG/DL (ref 0–150)
TROPONIN: <0.01 NG/ML
VLDLC SERPL CALC-MCNC: 17 MG/DL
WBC # BLD: 10.9 K/UL (ref 4–11)

## 2021-10-13 PROCEDURE — 78452 HT MUSCLE IMAGE SPECT MULT: CPT

## 2021-10-13 PROCEDURE — 6370000000 HC RX 637 (ALT 250 FOR IP): Performed by: STUDENT IN AN ORGANIZED HEALTH CARE EDUCATION/TRAINING PROGRAM

## 2021-10-13 PROCEDURE — G0378 HOSPITAL OBSERVATION PER HR: HCPCS

## 2021-10-13 PROCEDURE — 99282 EMERGENCY DEPT VISIT SF MDM: CPT

## 2021-10-13 PROCEDURE — 84484 ASSAY OF TROPONIN QUANT: CPT

## 2021-10-13 PROCEDURE — 2580000003 HC RX 258: Performed by: INTERNAL MEDICINE

## 2021-10-13 PROCEDURE — 93005 ELECTROCARDIOGRAM TRACING: CPT | Performed by: STUDENT IN AN ORGANIZED HEALTH CARE EDUCATION/TRAINING PROGRAM

## 2021-10-13 PROCEDURE — 83880 ASSAY OF NATRIURETIC PEPTIDE: CPT

## 2021-10-13 PROCEDURE — 83690 ASSAY OF LIPASE: CPT

## 2021-10-13 PROCEDURE — 71046 X-RAY EXAM CHEST 2 VIEWS: CPT

## 2021-10-13 PROCEDURE — 85379 FIBRIN DEGRADATION QUANT: CPT

## 2021-10-13 PROCEDURE — A9502 TC99M TETROFOSMIN: HCPCS | Performed by: INTERNAL MEDICINE

## 2021-10-13 PROCEDURE — 36415 COLL VENOUS BLD VENIPUNCTURE: CPT

## 2021-10-13 PROCEDURE — 87635 SARS-COV-2 COVID-19 AMP PRB: CPT

## 2021-10-13 PROCEDURE — 6360000002 HC RX W HCPCS: Performed by: INTERNAL MEDICINE

## 2021-10-13 PROCEDURE — 6370000000 HC RX 637 (ALT 250 FOR IP): Performed by: INTERNAL MEDICINE

## 2021-10-13 PROCEDURE — 85025 COMPLETE CBC W/AUTO DIFF WBC: CPT

## 2021-10-13 PROCEDURE — 93005 ELECTROCARDIOGRAM TRACING: CPT | Performed by: INTERNAL MEDICINE

## 2021-10-13 PROCEDURE — 93010 ELECTROCARDIOGRAM REPORT: CPT | Performed by: INTERNAL MEDICINE

## 2021-10-13 PROCEDURE — 83036 HEMOGLOBIN GLYCOSYLATED A1C: CPT

## 2021-10-13 PROCEDURE — 99221 1ST HOSP IP/OBS SF/LOW 40: CPT | Performed by: NURSE PRACTITIONER

## 2021-10-13 PROCEDURE — 80053 COMPREHEN METABOLIC PANEL: CPT

## 2021-10-13 PROCEDURE — 93306 TTE W/DOPPLER COMPLETE: CPT

## 2021-10-13 PROCEDURE — 3430000000 HC RX DIAGNOSTIC RADIOPHARMACEUTICAL: Performed by: INTERNAL MEDICINE

## 2021-10-13 PROCEDURE — 80061 LIPID PANEL: CPT

## 2021-10-13 PROCEDURE — 96361 HYDRATE IV INFUSION ADD-ON: CPT

## 2021-10-13 PROCEDURE — 99283 EMERGENCY DEPT VISIT LOW MDM: CPT

## 2021-10-13 PROCEDURE — 93017 CV STRESS TEST TRACING ONLY: CPT

## 2021-10-13 PROCEDURE — 96360 HYDRATION IV INFUSION INIT: CPT

## 2021-10-13 RX ORDER — ASPIRIN 81 MG/1
324 TABLET, CHEWABLE ORAL ONCE
Status: COMPLETED | OUTPATIENT
Start: 2021-10-13 | End: 2021-10-13

## 2021-10-13 RX ORDER — SODIUM CHLORIDE 9 MG/ML
INJECTION, SOLUTION INTRAVENOUS CONTINUOUS
Status: DISCONTINUED | OUTPATIENT
Start: 2021-10-13 | End: 2021-10-13 | Stop reason: HOSPADM

## 2021-10-13 RX ORDER — ONDANSETRON 2 MG/ML
4 INJECTION INTRAMUSCULAR; INTRAVENOUS EVERY 6 HOURS PRN
Status: DISCONTINUED | OUTPATIENT
Start: 2021-10-13 | End: 2021-10-13 | Stop reason: HOSPADM

## 2021-10-13 RX ORDER — POTASSIUM CHLORIDE 7.45 MG/ML
10 INJECTION INTRAVENOUS PRN
Status: DISCONTINUED | OUTPATIENT
Start: 2021-10-13 | End: 2021-10-13 | Stop reason: HOSPADM

## 2021-10-13 RX ORDER — SODIUM CHLORIDE 0.9 % (FLUSH) 0.9 %
5-40 SYRINGE (ML) INJECTION PRN
Status: DISCONTINUED | OUTPATIENT
Start: 2021-10-13 | End: 2021-10-13 | Stop reason: HOSPADM

## 2021-10-13 RX ORDER — NICOTINE POLACRILEX 4 MG
15 LOZENGE BUCCAL PRN
Status: DISCONTINUED | OUTPATIENT
Start: 2021-10-13 | End: 2021-10-13 | Stop reason: HOSPADM

## 2021-10-13 RX ORDER — ATORVASTATIN CALCIUM 10 MG/1
20 TABLET, FILM COATED ORAL DAILY
Status: DISCONTINUED | OUTPATIENT
Start: 2021-10-13 | End: 2021-10-13 | Stop reason: HOSPADM

## 2021-10-13 RX ORDER — MAGNESIUM SULFATE IN WATER 40 MG/ML
2000 INJECTION, SOLUTION INTRAVENOUS PRN
Status: DISCONTINUED | OUTPATIENT
Start: 2021-10-13 | End: 2021-10-13 | Stop reason: HOSPADM

## 2021-10-13 RX ORDER — SODIUM CHLORIDE 0.9 % (FLUSH) 0.9 %
5-40 SYRINGE (ML) INJECTION EVERY 12 HOURS SCHEDULED
Status: DISCONTINUED | OUTPATIENT
Start: 2021-10-13 | End: 2021-10-13 | Stop reason: HOSPADM

## 2021-10-13 RX ORDER — MORPHINE SULFATE 2 MG/ML
2 INJECTION, SOLUTION INTRAMUSCULAR; INTRAVENOUS EVERY 4 HOURS PRN
Status: DISCONTINUED | OUTPATIENT
Start: 2021-10-13 | End: 2021-10-13 | Stop reason: HOSPADM

## 2021-10-13 RX ORDER — ASPIRIN 81 MG/1
81 TABLET ORAL DAILY
Status: DISCONTINUED | OUTPATIENT
Start: 2021-10-13 | End: 2021-10-13 | Stop reason: HOSPADM

## 2021-10-13 RX ORDER — POLYETHYLENE GLYCOL 3350 17 G/17G
17 POWDER, FOR SOLUTION ORAL DAILY PRN
Status: DISCONTINUED | OUTPATIENT
Start: 2021-10-13 | End: 2021-10-13 | Stop reason: HOSPADM

## 2021-10-13 RX ORDER — DEXTROSE MONOHYDRATE 50 MG/ML
100 INJECTION, SOLUTION INTRAVENOUS PRN
Status: DISCONTINUED | OUTPATIENT
Start: 2021-10-13 | End: 2021-10-13 | Stop reason: HOSPADM

## 2021-10-13 RX ORDER — ONDANSETRON 4 MG/1
4 TABLET, ORALLY DISINTEGRATING ORAL EVERY 8 HOURS PRN
Status: DISCONTINUED | OUTPATIENT
Start: 2021-10-13 | End: 2021-10-13 | Stop reason: HOSPADM

## 2021-10-13 RX ORDER — POTASSIUM CHLORIDE 20 MEQ/1
40 TABLET, EXTENDED RELEASE ORAL PRN
Status: DISCONTINUED | OUTPATIENT
Start: 2021-10-13 | End: 2021-10-13 | Stop reason: HOSPADM

## 2021-10-13 RX ORDER — SODIUM CHLORIDE 9 MG/ML
25 INJECTION, SOLUTION INTRAVENOUS PRN
Status: DISCONTINUED | OUTPATIENT
Start: 2021-10-13 | End: 2021-10-13 | Stop reason: HOSPADM

## 2021-10-13 RX ORDER — DEXTROSE MONOHYDRATE 25 G/50ML
12.5 INJECTION, SOLUTION INTRAVENOUS PRN
Status: DISCONTINUED | OUTPATIENT
Start: 2021-10-13 | End: 2021-10-13 | Stop reason: HOSPADM

## 2021-10-13 RX ORDER — ACETAMINOPHEN 650 MG/1
650 SUPPOSITORY RECTAL EVERY 6 HOURS PRN
Status: DISCONTINUED | OUTPATIENT
Start: 2021-10-13 | End: 2021-10-13 | Stop reason: HOSPADM

## 2021-10-13 RX ORDER — ACETAMINOPHEN 325 MG/1
650 TABLET ORAL EVERY 6 HOURS PRN
Status: DISCONTINUED | OUTPATIENT
Start: 2021-10-13 | End: 2021-10-13 | Stop reason: HOSPADM

## 2021-10-13 RX ORDER — NITROGLYCERIN 0.4 MG/1
0.4 TABLET SUBLINGUAL EVERY 5 MIN PRN
Status: DISCONTINUED | OUTPATIENT
Start: 2021-10-13 | End: 2021-10-13 | Stop reason: HOSPADM

## 2021-10-13 RX ORDER — LANOLIN ALCOHOL/MO/W.PET/CERES
9 CREAM (GRAM) TOPICAL NIGHTLY
Status: DISCONTINUED | OUTPATIENT
Start: 2021-10-13 | End: 2021-10-13 | Stop reason: HOSPADM

## 2021-10-13 RX ADMIN — TETROFOSMIN 10.8 MILLICURIE: 1.38 INJECTION, POWDER, LYOPHILIZED, FOR SOLUTION INTRAVENOUS at 12:55

## 2021-10-13 RX ADMIN — SODIUM CHLORIDE: 9 INJECTION, SOLUTION INTRAVENOUS at 06:18

## 2021-10-13 RX ADMIN — ASPIRIN 324 MG: 81 TABLET, CHEWABLE ORAL at 00:40

## 2021-10-13 RX ADMIN — TETROFOSMIN 30.2 MILLICURIE: 1.38 INJECTION, POWDER, LYOPHILIZED, FOR SOLUTION INTRAVENOUS at 14:03

## 2021-10-13 RX ADMIN — INSULIN LISPRO 2 UNITS: 100 INJECTION, SOLUTION INTRAVENOUS; SUBCUTANEOUS at 06:23

## 2021-10-13 RX ADMIN — REGADENOSON 0.4 MG: 0.08 INJECTION, SOLUTION INTRAVENOUS at 14:02

## 2021-10-13 ASSESSMENT — ENCOUNTER SYMPTOMS
RHINORRHEA: 0
NAUSEA: 1
BACK PAIN: 0
SHORTNESS OF BREATH: 0
CONSTIPATION: 0
DIARRHEA: 0
VOMITING: 1
ABDOMINAL PAIN: 0
PHOTOPHOBIA: 0
SORE THROAT: 0
COUGH: 0

## 2021-10-13 ASSESSMENT — PAIN DESCRIPTION - FREQUENCY: FREQUENCY: CONTINUOUS

## 2021-10-13 ASSESSMENT — PAIN SCALES - GENERAL
PAINLEVEL_OUTOF10: 3
PAINLEVEL_OUTOF10: 6

## 2021-10-13 ASSESSMENT — PAIN DESCRIPTION - PAIN TYPE
TYPE: ACUTE PAIN
TYPE: ACUTE PAIN

## 2021-10-13 ASSESSMENT — PAIN DESCRIPTION - DESCRIPTORS: DESCRIPTORS: HEAVINESS

## 2021-10-13 ASSESSMENT — PAIN DESCRIPTION - LOCATION: LOCATION: CHEST
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
## 2021-10-13 NOTE — PROGRESS NOTES
Pt family Mr Kobi Day called, updated on plan of care. Pt gave permission. Pt alert and oriented x4 at this time. States his family does not allow him to make decision for himself, family states pt has diagnosis of dementia.
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
## 2021-10-13 NOTE — ED NOTES
0153-[place] sent to Dr Bryant Au  10/13/21 0153    0225-Dr Velasquez returned call and spoke with Dr Isi Cordero  10/13/21 0225

## 2021-10-13 NOTE — Clinical Note
Patient Class: Observation [104]   REQUIRED: Diagnosis: Chest pain [873437]   Estimated Length of Stay: Estimated stay of less than 2 midnights   Admitting Provider: Luisa Brody [[id number]]   Telemetry/Cardiac Monitoring Required?: Yes

## 2021-10-13 NOTE — CARE COORDINATION
Review of chart for any potential discharge needs. It is noted pt has insurance, a PCP and an employer on the facesheet. Pt's o2 stats are currently 98% on room air per vitals in epic. No needs identified for discharge intervention at this time. MD and bedside RN  if needs arise please consult case management for discharge intervention. CM not following at this time.

## 2021-10-14 LAB
ESTIMATED AVERAGE GLUCOSE: 177.2 MG/DL
HBA1C MFR BLD: 7.8 %

## 2021-10-28 ENCOUNTER — OFFICE VISIT (OUTPATIENT)
Dept: CARDIOLOGY CLINIC | Age: 56
End: 2021-10-28
Payer: COMMERCIAL

## 2021-10-28 VITALS
BODY MASS INDEX: 25.06 KG/M2 | SYSTOLIC BLOOD PRESSURE: 94 MMHG | HEART RATE: 76 BPM | HEIGHT: 72 IN | WEIGHT: 185 LBS | DIASTOLIC BLOOD PRESSURE: 58 MMHG | OXYGEN SATURATION: 98 %

## 2021-10-28 DIAGNOSIS — I35.0 NONRHEUMATIC AORTIC VALVE STENOSIS: ICD-10-CM

## 2021-10-28 PROBLEM — R07.2 PRECORDIAL PAIN: Status: ACTIVE | Noted: 2021-10-13

## 2021-10-28 PROCEDURE — 99204 OFFICE O/P NEW MOD 45 MIN: CPT | Performed by: INTERNAL MEDICINE

## 2021-10-28 RX ORDER — HYDROXYZINE HYDROCHLORIDE 25 MG/1
25 TABLET, FILM COATED ORAL 3 TIMES DAILY PRN
COMMUNITY

## 2021-10-28 NOTE — LETTER
1965  [facility]   Cardiac Consultation    Referring Provider:  Radhika Narayanan     Chief Complaint   Patient presents with    New Patient    Chest Pain     \"sharp pressure, comes and goes\"        Anthony Zepeda   1965      History of Present Illness:    Anthony Zepeda is a 64 y.o. male who is here today as a NEW patient consult for cardiology, referred by [facility] for chest pain and aortic stenosis. He has a past medical history of schizophrenia, frontotemporal dementia, DM type 2, diabetic ulcer of right foot, Factor V Leiden, hyperlipidemia, Chronic back pain, diabetic neuropathy, hypertension, and hx of blood clots. He presented to the ER on 10/13/2021 due to chest pain, troponin <0.01 x2. An echocardiogram from 10/13/2021 showed an EF of 55%, moderate aortic stenosis. Stress test 10/13/2021 showed no evidence of ischemia or scar. He is a daily smoker. He has a family history o heart diease. Today he is here with his daughter as a new patient. He states he checks his blood pressure at home and it generally runs 110/120-70's. He states he has been having sharp chest pain which started several months ago, pain seems to be brought on with alcohol intake. Chest pain radiates into his back and has radiated into his neck as well. Chest pain does not seems to be related to activity or exertion. Seems to be brought on by stress. He states he also has back pain from an injury from years ago that bothers him. His daughter states he has been taken to the ER 4 times due to chest pain. He states he walks his dog occasionally and also does some general exercises. He may be moving into a facility. Soon. Patient currently denies any weight gain, edema, palpitations, shortness of breath, dizziness, and syncope.       Past Medical History:   has a past medical history of Age-related cataract of left eye, Arthritis of knee, right, Broken teeth, Chronic low back pain, Colon polyps, Coronary artery arteriosclerosis, Dementia ([facility] [address] Depression, Diabetic neuropathy ([facility] [address] Factor V Leiden ([facility] [address] Frozen shoulder syndrome, History of blood transfusion, Hx of blood clots, Hyperlipidemia, Hypertension, Kidney stones, Nasal lesion, Nocardia infection, Sleep apnea, Type II or unspecified type diabetes mellitus without mention of complication, not stated as uncontrolled, and Vitreous hemorrhage, right eye ([facility] [address] Surgical History:   has a past surgical history that includes Uvulectomy; Tonsillectomy and adenoidectomy; Mouth surgery; shoulder surgery (2014); Shoulder arthroscopy (Left, 5/2015); Knee cartilage surgery (Right); Ureter stent placement (Right, 2017); Colonoscopy (10/27/2017); Colonoscopy (06/11/2018); eye surgery; Colonoscopy (N/A, 8/24/2020); and Colonoscopy (8/24/2020). Social History:   reports that he has been smoking cigarettes. He started smoking about 41 years ago. He has a 20.00 pack-year smoking history. He has never used smokeless tobacco. He reports previous alcohol use. He reports that he does not use drugs. Family History:  family history includes Arthritis in his father; Cancer in his father and mother; Heart Disease in his father; Other in his mother. Home Medications:  Prior to Admission medications    Medication Sig Start Date End Date Taking?  Authorizing Provider   hydrOXYzine (ATARAX) 25 MG tablet Take 25 mg by mouth 3 times daily as needed for Itching   Yes Historical Provider, MD   glipiZIDE (GLUCOTROL) 10 MG tablet Take 1 tablet by mouth 2 times daily  Patient taking differently: Take 10 mg by mouth 2 times daily Pt states he is taking it one time daily 5/25/21  Yes Krishan Morelos MD   atorvastatin (LIPITOR) 20 MG tablet Take 1 tablet by mouth daily 5/25/21  Yes Krishan Morelos MD   metFORMIN (GLUCOPHAGE) 850 MG tablet Take 850 mg by mouth 3 times daily (before meals)   Yes Historical Provider, MD   Omega-3 300 MG CAPS Take 1 tablet by mouth See Admin Instructions Yes Historical Provider, MD   Melatonin 10 MG TABS Take 1 tablet by mouth nightly   Yes Historical Provider, MD   Cinnamon 500 MG CAPS 1 capsule by Other route nightly    Yes Historical Provider, MD   GLUCOSAMINE HCL PO Take 1 capsule by mouth daily    Yes Historical Provider, MD   Multiple Vitamins-Minerals (MULTIVITAMIN ADULT PO) Take 1 tablet by mouth daily    Yes Historical Provider, MD   glucose blood VI test strips (KRYSTAL CONTOUR TEST) strip USE AS DIRECTED AS NEEDED 4/19/16  Yes Vane Gilbert MD   aspirin 81 MG EC tablet Take 1 tablet by mouth daily. 7/18/12  Yes Vane Gilbert MD   nicotine (NICODERM CQ) 21 MG/24HR Place 1 patch onto the skin every 24 hours  Patient not taking: Reported on 10/28/2021    Historical Provider, MD        Allergies:  Effexor xr [venlafaxine hcl er], Invokana [canagliflozin], Lorazepam, Penicillins, and Natasha Bump [insulin glargine]     Review of Systems:   · Constitutional: there has been no unanticipated weight loss. There's been no change in energy level, sleep pattern, or activity level. · Eyes: No visual changes or diplopia. No scleral icterus. · ENT: No Headaches, hearing loss or vertigo. No mouth sores or sore throat. · Cardiovascular: Reviewed in HPI  · Respiratory: No cough or wheezing, no sputum production. No hematemesis. · Gastrointestinal: No abdominal pain, appetite loss, blood in stools. No change in bowel or bladder habits. · Genitourinary: No dysuria, trouble voiding, or hematuria. · Musculoskeletal:  No gait disturbance, weakness or joint complaints. · Integumentary: No rash or pruritis. · Neurological: No headache, diplopia, change in muscle strength, numbness or tingling. No change in gait, balance, coordination, mood, affect, memory, mentation, behavior. · Psychiatric: No anxiety, no depression. · Endocrine: No malaise, fatigue or temperature intolerance. No excessive thirst, fluid intake, or urination. No tremor.   · Hematologic/Lymphatic: No abnormal bruising or bleeding, blood clots or swollen lymph nodes. · Allergic/Immunologic: No nasal congestion or hives. Physical Examination:    Vitals:    10/28/21 0856   BP: (!) 94/58   Pulse:    SpO2:         Constitutional and General Appearance: NAD   Respiratory:  · Normal excursion and expansion without use of accessory muscles  · Resp Auscultation: Normal breath sounds without dullness  Cardiovascular:  · The apical impulses not displaced  · Heart tones are crisp and normal  · Cervical veins are not engorged  · The carotid upstroke is normal in amplitude and contour without delay or bruit  · Normal S1S2, No S3, 2/6 Murmur  · Peripheral pulses are symmetrical and full  · There is no clubbing, cyanosis of the extremities. · No edema  · Femoral Arteries: 2+ and equal  · Pedal Pulses: 2+ and equal   Abdomen:  · No masses or tenderness  · Liver/Spleen: No Abnormalities Noted  Neurological/Psychiatric:  · Alert and oriented in all spheres  · Moves all extremities well  · Exhibits normal gait balance and coordination  · No abnormalities of mood, affect, memory, mentation, or behavior are noted    Echocardiogram 10/13/2021   Summary   Left ventricular systolic function is normal with ejection fraction   estimated at 55%. No regional wall motion abnormalities. There is mild concentric left ventricular hypertrophy. Normal left ventricular diastolic filling pressure. Moderate aortic stenosis. The right atrium is mildly dilated.     Stress test 10/13/2021  Summary  Technically difficult study with motion artifact  Low normal LVEF 58%  Grossly normal wall motion  Fixed inferior defect is likely due to attenuation artifact, less likely  scar  No ischemia  Lower risk study       Assessment:   Chest pain - atypical  Murmur due to AS  Hyperlipidemia   Diabetes mellitus   Moderate aortic stenosis - asymptomatic   Factor V Leiden  Schizophrenia  History of blood clots   Smoking - cessation discussed   Alcohol abuse - cessation discussed   Family history of heart diease       Plan:  Ok to use Nicotine patches   No cardiac contraindications for oral surgery- would recommend premedication with antibiotics prior (to be prescribed by oral surgeon). He can hold his Aspirin for 7 days prior. Recommend repeating an echocardiogram in 1 year   Recommend alcohol and smoking cessation   Cardiac medications reviewed including indications and pertinent side effects     Check blood pressure and heart rate at home a few times per week- keep a log with dates and times and bring to office visit   Regular exercise and following a healthy diet encouraged   Follow up with me in 1 year     Scribe's attestation: This note was scribed in the presence of Dr. David Duff M.D. By Jean-Pierre Crook RN    The scribes documentation has been prepared under my direction and personally reviewed by me in its entirety. I confirm that the note above accurately reflects all work, treatment, procedures, and medical decision making performed by me. Dr. David Duff MD    Thank you for allowing me to participate in the care of this individual.    Colleen Dutton.  Ayaz Solis M.D., Samantha Dunbar

## 2021-10-28 NOTE — PATIENT INSTRUCTIONS
Plan:  Ok to use Nicotine patches   No cardiac contraindications for oral surgery- would recommend premedication with antibiotics prior to be prescribed by oral surgeon. He can hold his Aspirin for 7 days prior. Recommend repeating an echocardiogram in 1 year   Recommend alcohol and smoking cessation   Cardiac medications reviewed including indications and pertinent side effects     Check blood pressure and heart rate at home a few times per week- keep a log with dates and times and bring to office visit   Regular exercise and following a healthy diet encouraged   Follow up with me in 1 year   Your provider has ordered testing for further evaluation. An order/prescription has been included in your paper work.  To schedule outpatient testing, contact Central Scheduling by calling [employer] ([phone]).

## 2021-10-28 NOTE — PROGRESS NOTES
arteriosclerosis, Dementia ([facility] [address] Depression, Diabetic neuropathy ([facility] [address] Factor V Leiden ([facility] [address] Frozen shoulder syndrome, History of blood transfusion, Hx of blood clots, Hyperlipidemia, Hypertension, Kidney stones, Nasal lesion, Nocardia infection, Sleep apnea, Type II or unspecified type diabetes mellitus without mention of complication, not stated as uncontrolled, and Vitreous hemorrhage, right eye ([facility] [address] Surgical History:   has a past surgical history that includes Uvulectomy; Tonsillectomy and adenoidectomy; Mouth surgery; shoulder surgery (2014); Shoulder arthroscopy (Left, 5/2015); Knee cartilage surgery (Right); Ureter stent placement (Right, 2017); Colonoscopy (10/27/2017); Colonoscopy (06/11/2018); eye surgery; Colonoscopy (N/A, 8/24/2020); and Colonoscopy (8/24/2020). Social History:   reports that he has been smoking cigarettes. He started smoking about 41 years ago. He has a 20.00 pack-year smoking history. He has never used smokeless tobacco. He reports previous alcohol use. He reports that he does not use drugs. Family History:  family history includes Arthritis in his father; Cancer in his father and mother; Heart Disease in his father; Other in his mother. Home Medications:  Prior to Admission medications    Medication Sig Start Date End Date Taking?  Authorizing Provider   hydrOXYzine (ATARAX) 25 MG tablet Take 25 mg by mouth 3 times daily as needed for Itching   Yes Historical Provider, MD   glipiZIDE (GLUCOTROL) 10 MG tablet Take 1 tablet by mouth 2 times daily  Patient taking differently: Take 10 mg by mouth 2 times daily Pt states he is taking it one time daily 5/25/21  Yes Letty Reynolds MD   atorvastatin (LIPITOR) 20 MG tablet Take 1 tablet by mouth daily 5/25/21  Yes Letty Reynolds MD   metFORMIN (GLUCOPHAGE) 850 MG tablet Take 850 mg by mouth 3 times daily (before meals)   Yes Historical Provider, MD   Omega-3 300 MG CAPS Take 1 tablet by mouth See Admin Instructions   Yes Historical Provider, MD   Melatonin 10 MG TABS Take 1 tablet by mouth nightly   Yes Historical Provider, MD   Cinnamon 500 MG CAPS 1 capsule by Other route nightly    Yes Historical Provider, MD   GLUCOSAMINE HCL PO Take 1 capsule by mouth daily    Yes Historical Provider, MD   Multiple Vitamins-Minerals (MULTIVITAMIN ADULT PO) Take 1 tablet by mouth daily    Yes Historical Provider, MD   glucose blood VI test strips (KRYSTAL CONTOUR TEST) strip USE AS DIRECTED AS NEEDED 4/19/16  Yes Mars Arias MD   aspirin 81 MG EC tablet Take 1 tablet by mouth daily. 7/18/12  Yes Mars Arias MD   nicotine (NICODERM CQ) 21 MG/24HR Place 1 patch onto the skin every 24 hours  Patient not taking: Reported on 10/28/2021    Historical Provider, MD        Allergies:  Effexor xr [venlafaxine hcl er], Invokana [canagliflozin], Lorazepam, Penicillins, and Jef Argue [insulin glargine]     Review of Systems:   · Constitutional: there has been no unanticipated weight loss. There's been no change in energy level, sleep pattern, or activity level. · Eyes: No visual changes or diplopia. No scleral icterus. · ENT: No Headaches, hearing loss or vertigo. No mouth sores or sore throat. · Cardiovascular: Reviewed in HPI  · Respiratory: No cough or wheezing, no sputum production. No hematemesis. · Gastrointestinal: No abdominal pain, appetite loss, blood in stools. No change in bowel or bladder habits. · Genitourinary: No dysuria, trouble voiding, or hematuria. · Musculoskeletal:  No gait disturbance, weakness or joint complaints. · Integumentary: No rash or pruritis. · Neurological: No headache, diplopia, change in muscle strength, numbness or tingling. No change in gait, balance, coordination, mood, affect, memory, mentation, behavior. · Psychiatric: No anxiety, no depression. · Endocrine: No malaise, fatigue or temperature intolerance. No excessive thirst, fluid intake, or urination. No tremor.   · Hematologic/Lymphatic: No abnormal bruising or bleeding, blood clots or swollen lymph nodes. · Allergic/Immunologic: No nasal congestion or hives. Physical Examination:    Vitals:    10/28/21 0856   BP: (!) 94/58   Pulse:    SpO2:         Constitutional and General Appearance: NAD   Respiratory:  · Normal excursion and expansion without use of accessory muscles  · Resp Auscultation: Normal breath sounds without dullness  Cardiovascular:  · The apical impulses not displaced  · Heart tones are crisp and normal  · Cervical veins are not engorged  · The carotid upstroke is normal in amplitude and contour without delay or bruit  · Normal S1S2, No S3, 2/6 Murmur  · Peripheral pulses are symmetrical and full  · There is no clubbing, cyanosis of the extremities. · No edema  · Femoral Arteries: 2+ and equal  · Pedal Pulses: 2+ and equal   Abdomen:  · No masses or tenderness  · Liver/Spleen: No Abnormalities Noted  Neurological/Psychiatric:  · Alert and oriented in all spheres  · Moves all extremities well  · Exhibits normal gait balance and coordination  · No abnormalities of mood, affect, memory, mentation, or behavior are noted    Echocardiogram 10/13/2021   Summary   Left ventricular systolic function is normal with ejection fraction   estimated at 55%. No regional wall motion abnormalities. There is mild concentric left ventricular hypertrophy. Normal left ventricular diastolic filling pressure. Moderate aortic stenosis. The right atrium is mildly dilated.     Stress test 10/13/2021  Summary  Technically difficult study with motion artifact  Low normal LVEF 58%  Grossly normal wall motion  Fixed inferior defect is likely due to attenuation artifact, less likely  scar  No ischemia  Lower risk study       Assessment:   Chest pain - atypical  Murmur due to AS  Hyperlipidemia   Diabetes mellitus   Moderate aortic stenosis - asymptomatic   Factor V Leiden  Schizophrenia  History of blood clots   Smoking - cessation discussed   Alcohol abuse - cessation discussed   Family history of heart diease       Plan:  Ok to use Nicotine patches   No cardiac contraindications for oral surgery- would recommend premedication with antibiotics prior (to be prescribed by oral surgeon). He can hold his Aspirin for 7 days prior. Recommend repeating an echocardiogram in 1 year   Recommend alcohol and smoking cessation   Cardiac medications reviewed including indications and pertinent side effects     Check blood pressure and heart rate at home a few times per week- keep a log with dates and times and bring to office visit   Regular exercise and following a healthy diet encouraged   Follow up with me in 1 year     Scribe's attestation: This note was scribed in the presence of Dr. Jasper Moreno M.D. By Moses Soto RN    The scribes documentation has been prepared under my direction and personally reviewed by me in its entirety. I confirm that the note above accurately reflects all work, treatment, procedures, and medical decision making performed by me. Dr. Jasper Moreno MD    Thank you for allowing me to participate in the care of this individual.    Alan Alvares M.D., Raheel Quinonez

## 2023-01-12 ENCOUNTER — HOSPITAL ENCOUNTER (OUTPATIENT)
Dept: NON INVASIVE DIAGNOSTICS | Age: 58
Discharge: HOME | End: 2023-01-12
Payer: COMMERCIAL

## 2023-01-12 DIAGNOSIS — I35.0 NONRHEUMATIC AORTIC VALVE STENOSIS: ICD-10-CM

## 2023-01-12 LAB
LV EF: 63 %
LVEF MODALITY: NORMAL

## 2023-01-12 PROCEDURE — 93306 TTE W/DOPPLER COMPLETE: CPT

## 2023-01-13 ENCOUNTER — TELEPHONE (OUTPATIENT)
Dept: CARDIOLOGY CLINIC | Age: 58
End: 2023-01-13

## 2023-01-13 NOTE — TELEPHONE ENCOUNTER
Can you call pt and schedule him to see Dr. Pradip Roberson in the [facility] AT [facility] on 1/27/23 at either 1pm or 4pm as a new patient for severe aortic stenosis?  Charlie HOYT

## 2023-01-13 NOTE — TELEPHONE ENCOUNTER
Created telephone encounter. Spoke with Lexi Dennis relayed message per [address] regarding echo. Pt verbalized understanding. Please contact pt for TAVR appt.

## 2023-01-13 NOTE — TELEPHONE ENCOUNTER
01/13-Spoke to pts POA and pt has been scheduled for the [facility] office on 01/27/2023 at 1 pm w/DCE for tavr/ severe AV stenosis

## 2023-01-13 NOTE — TELEPHONE ENCOUNTER
The patient is scheduled on 01/27/23 @ 1pm with Dr. Karine Hutchison @ the [facility] AT [facility].

## 2023-01-13 NOTE — TELEPHONE ENCOUNTER
----- Message from Misty Huizar MD sent at 1/13/2023  8:40 AM EST -----  Please notify patient that their echo shows AV stenosis is now severe and need to consider fixing it  Refer to TAVR clinic

## 2023-01-26 NOTE — PROGRESS NOTES
[address]  H+P  Consult  OP Visit  FU Visit   CC/HPI   CC Followup visit for cardiac conditions detailed in assessment and plan below. Intervention NONE   General Doing fair. Concerned with palacios, sob   Cardiac Sx no CP, no dizziness, no syncope, SOB   HISTORY/ALLERGY/ROS   MEDHx  has a past medical history of Age-related cataract of left eye, Arthritis of knee, right, Broken teeth, Chronic low back pain, Colon polyps, Coronary artery arteriosclerosis, Dementia ([address] Depression, Diabetic neuropathy ([facility] [address] Factor V Leiden ([facility] [address] Frozen shoulder syndrome, History of blood transfusion, Hx of blood clots, Hyperlipidemia, Hypertension, Kidney stones, Nasal lesion, Nocardia infection, Sleep apnea, Type II or unspecified type diabetes mellitus without mention of complication, not stated as uncontrolled, and Vitreous hemorrhage, right eye ([facility] [address] SURGHx  has a past surgical history that includes Uvulectomy; Tonsillectomy and adenoidectomy; Mouth surgery; shoulder surgery (2014); Shoulder arthroscopy (Left, 5/2015); Knee cartilage surgery (Right); Ureter stent placement (Right, 2017); Colonoscopy (10/27/2017); Colonoscopy (06/11/2018); eye surgery; Colonoscopy (N/A, 8/24/2020); and Colonoscopy (8/24/2020). SOCHx  reports that he has been smoking cigarettes. He started smoking about 42 years ago. He has a 20.00 pack-year smoking history. He has never used smokeless tobacco. He reports that he does not currently use alcohol. He reports that he does not use drugs. FAMx family history includes Arthritis in his father; Cancer in his father and mother; Heart Disease in his father; Other in his mother.    ALLERG Effexor xr [venlafaxine hcl er], Invokana [canagliflozin], Lorazepam, Penicillins, and Basaglar kwikpen [insulin glargine]   ROS Full ROS obtained and negative except as mentioned in HPI   MEDICATIONS   Current Outpatient Medications   Medication Sig Dispense Refill    hydrOXYzine (ATARAX) 25 MG tablet Take 25 mg by mouth 3 times daily as needed for Itching      nicotine (NICODERM CQ) 21 MG/24HR Place 1 patch onto the skin every 24 hours (Patient not taking: Reported on 10/28/2021)      glipiZIDE (GLUCOTROL) 10 MG tablet Take 1 tablet by mouth 2 times daily (Patient taking differently: Take 10 mg by mouth 2 times daily Pt states he is taking it one time daily) 60 tablet 3    atorvastatin (LIPITOR) 20 MG tablet Take 1 tablet by mouth daily 90 tablet 1    metFORMIN (GLUCOPHAGE) 850 MG tablet Take 850 mg by mouth 3 times daily (before meals)      Omega-3 300 MG CAPS Take 1 tablet by mouth See Admin Instructions      Melatonin 10 MG TABS Take 1 tablet by mouth nightly      Cinnamon 500 MG CAPS 1 capsule by Other route nightly       GLUCOSAMINE HCL PO Take 1 capsule by mouth daily       Multiple Vitamins-Minerals (MULTIVITAMIN ADULT PO) Take 1 tablet by mouth daily       glucose blood VI test strips (KRYSTAL CONTOUR TEST) strip USE AS DIRECTED AS NEEDED 150 each 6    aspirin 81 MG EC tablet Take 1 tablet by mouth daily. 30 tablet 3     No current facility-administered medications for this visit. PHYSICAL EXAM   Vitals /70 (Site: Left Upper Arm, Position: Sitting, Cuff Size: Medium Adult)   Pulse 88   Ht 6' (1.829 m)   Wt 210 lb (95.3 kg)   SpO2 98%   BMI 28.48 kg/m²    Gen Alert, coop, no distress Heart  Rrr, 3/6   Head NC, AT, no abnorm Abd  Soft, NT, +BS, no mass, no OM   Eyes PER, conj/corn clear Ext  Ext nl, AT, no C/C/E   Nose Nares nl, no drain, NT Pulse 2+ and symmetric   Throat Lips, mucosa, tongue nl Skin Col/text/turg nl, no vis rash/les   Neck S/S, TM, NT, no bruit/JVD Psych Nl mood and affect   Lung CTA-B, unlabored, no DTP Lymph   No cervical or axillary LA   Ch wall NT, no deform Neuro  Nl gross M/S exam      COMPLIANCE   Counseling provided on diet, exercise, weight loss, smoking, alcohol, drugs.    CODING   SCI (18475) - 30-39 mins spent reviewing hx/tests/consults, performing exam, counseling/educating, ordering meds/tests/procedures, referring/communicating w/PCP/consultants, documenting in EMR, interpreting results, communicating medical information and plan with family. SCRIBE ATTESTATION   Nurse NA   Doctor Not applicable   ASSESSMENT AND PLAN   *AS    Date EF Detail   Sx     Onset: Onset: recent  Duration: weeks  Temporal: Worsening   CHF Type   Chronic diastolic   NYHA   II   FRAME   CHF Dx (2 Major or 1 Major+2 Minor)  Major: None  Minor: LOZA   CHF Meds   ASA, STATIN   TTE 10/21  1/23 55%  65% AS , MG 15  AS PV 4.01, MG 33   Plan     Comprehensive discussion regarding options including Med v TAVR v SAVR  AVR workup including but not limited to:   Angiography - LHC (R/B/O discussed)  Imaging - CTA C/A/P (Risk LASHELL discussed), Carotid US  Consultations - CT Surgery and Dentist  Further recommendations regarding AVR variety pending diagnostic testing  Appreciate referral from Dr. Jaimee Vogel   *CHOL  LDL 48, 10/21   Plan Counseled on diet/exercise/weight, continue HI/MT statin   *TOB  Status Active smoker, available PFTs reviewed personally   Plan Continued avoidance of 1st and 2nd hand smoke, counseled on risks/cessation   *COMPLIANCE  Status Compliant   Plan Discussed compliance with meds/diet/salt/exercise; avoid tob/alc/drugs   *FOLLOWUP  After procedures

## 2023-01-27 ENCOUNTER — OFFICE VISIT (OUTPATIENT)
Dept: CARDIOLOGY CLINIC | Age: 58
End: 2023-01-27
Payer: COMMERCIAL

## 2023-01-27 VITALS
BODY MASS INDEX: 28.44 KG/M2 | SYSTOLIC BLOOD PRESSURE: 126 MMHG | WEIGHT: 210 LBS | OXYGEN SATURATION: 98 % | DIASTOLIC BLOOD PRESSURE: 70 MMHG | HEART RATE: 88 BPM | HEIGHT: 72 IN

## 2023-01-27 DIAGNOSIS — R42 DIZZINESS: ICD-10-CM

## 2023-01-27 DIAGNOSIS — R07.2 PRECORDIAL PAIN: ICD-10-CM

## 2023-01-27 DIAGNOSIS — Z72.0 TOBACCO ABUSE: ICD-10-CM

## 2023-01-27 DIAGNOSIS — F10.10 ALCOHOL ABUSE: ICD-10-CM

## 2023-01-27 DIAGNOSIS — E78.00 HYPERCHOLESTEROLEMIA: ICD-10-CM

## 2023-01-27 DIAGNOSIS — I35.0 NONRHEUMATIC AORTIC VALVE STENOSIS: Primary | ICD-10-CM

## 2023-01-27 PROCEDURE — 93000 ELECTROCARDIOGRAM COMPLETE: CPT | Performed by: INTERNAL MEDICINE

## 2023-01-27 PROCEDURE — 3078F DIAST BP <80 MM HG: CPT | Performed by: INTERNAL MEDICINE

## 2023-01-27 PROCEDURE — 99214 OFFICE O/P EST MOD 30 MIN: CPT | Performed by: INTERNAL MEDICINE

## 2023-01-27 PROCEDURE — 3074F SYST BP LT 130 MM HG: CPT | Performed by: INTERNAL MEDICINE

## 2023-01-27 RX ORDER — SERTRALINE HYDROCHLORIDE 25 MG/1
25 TABLET, FILM COATED ORAL DAILY
COMMUNITY

## 2023-01-27 RX ORDER — ARIPIPRAZOLE 5 MG/1
5 TABLET ORAL DAILY
COMMUNITY

## 2023-01-30 ENCOUNTER — TELEPHONE (OUTPATIENT)
Dept: CARDIOLOGY | Age: 58
End: 2023-01-30

## 2023-01-30 DIAGNOSIS — I35.0 NONRHEUMATIC AORTIC VALVE STENOSIS: Primary | ICD-10-CM

## 2023-01-30 NOTE — TELEPHONE ENCOUNTER
Tammy,   Can you schedule Mr. Small for a cath with Dr. Roche, TAVR CTA chest/abd/pelvis and carotid US all same day at [facility]? He has no dye allergy, no port, on no blood thinners and lives at home. Please schedule the cath last test of the day. Labs are ordered. Please call his daughter, Marcia, or ADAM Romero to schedule tests. Thank you, Charlie HOYT

## 2023-01-31 NOTE — TELEPHONE ENCOUNTER
TAVR testing is scheduled for 2-22-23 per below:     8:30 am - [facility] CTA CHEST ABD PELVIC W/CONTRAST  PREPS:  * NPO 4 hours prior to procedure (will be fasting after Midnight for Angiogram)  * Arrive 30 minutes prior to exam (8:00 am)  * No necklaces, underwire bras, body piercing, no pants with zippers, belts,or suspenders  * Bring a complete list of medications or the test cannot be completed. 9:00 am - CAROTID STUDY  * Please wear easy to remove clothing  * Please take all medication, unless otherwise instructed  * You will be here for approximately 1 hour        Lab work to be drawn prior to 2-22-23 2-22-23 /10:30 am - 12:00 pm [facility] w/[facility] @ [facility]  PREPS:  * Bring list of your medications. * Please notify us before the procedure if you are allergic to anything; especially x-ray contrast dye, iodine, nickel, or any type of jewlry. This is very important! * Do no eat or drink anthing at all after midnight (or 8 hours) prior to the procedure. * Take all morning medications EXCEPT any diuretics (water pills) the day of the procedure with a small amount of water. * If you lisa insulin in the morning, check your blood sugar the morning of your procedure. If your blood sugar is 120 or less, do not take insulin. If your blood suger is more than 120, take half the dose of your normal insulin. No matter what your blood sugar level is, take all oral diabetic medications. * You MUST have someone to drive you home - no driving for 24 hours after your procedure. If an intervention is performed, you might stay overnight in the hospital.   * Discharge instructions will be given to you at the time of your procedure.

## 2023-02-01 RX ORDER — SODIUM CHLORIDE 0.9 % (FLUSH) 0.9 %
5-40 SYRINGE (ML) INJECTION PRN
OUTPATIENT
Start: 2023-02-01

## 2023-02-10 ENCOUNTER — TELEPHONE (OUTPATIENT)
Dept: CARDIOLOGY CLINIC | Age: 58
End: 2023-02-10

## 2023-02-10 NOTE — TELEPHONE ENCOUNTER
Spoke to [facility], pts POA, ADAM. They are on their way to the ER now as pt has new diabetic foot ulcer that \"looks very infected\". Sepsis could be the culprit of his symptoms. All will be checked in the ER. Gumaro blackman said he would keep me updated.  Charlie HOYT

## 2023-02-10 NOTE — TELEPHONE ENCOUNTER
Child reports pt is Lethargic, Sleeping a lot, Lt Leg swollen and is cold all the time. Son is wondering what needs to be done and is open to going to the ER if DCE thinks that is what they should do. [facility] is POA for pt. Please advise [facility].

## 2023-02-20 ENCOUNTER — TELEPHONE (OUTPATIENT)
Dept: CARDIOLOGY CLINIC | Age: 58
End: 2023-02-20

## 2023-02-20 NOTE — TELEPHONE ENCOUNTER
Daughter Jaimie Mackey called requested to cancel CTA/Carotid & Angio due to Kimani Mcmillan just got surgery to remove some toes. They will be placing him in a [place]. She feels that at this time with his dementia the TAVR procedures will be too much on him. She will contact us again once he is stable. She apologized for the short notice and was very appreciative for all the assistance she's been given.

## 2023-02-22 ENCOUNTER — HOSPITAL ENCOUNTER (OUTPATIENT)
Dept: CARDIAC CATH/INVASIVE PROCEDURES | Age: 58
Discharge: HOME | End: 2023-02-22

## (undated) DEVICE — TRAP POLYP ETRAP

## (undated) DEVICE — ENDOSCOPY KIT: Brand: MEDLINE INDUSTRIES, INC.

## (undated) DEVICE — CLIP LIG L235CM RESOL 360 BX/20

## (undated) DEVICE — NEEDLE 25GAX5.0MM INJ CARR LOCKE

## (undated) DEVICE — CONTAINER SPEC 480ML CLR POLYSTYR 10% NEUT BUFF FRMLN ZN

## (undated) DEVICE — SINGLE-USE POLYPECTOMY SNARE: Brand: CAPTIFLEX

## (undated) DEVICE — AGENT CNTRST 10ML AMP INJ ELEVIEW 391530190015] ARIES PHARMACEUTICALS INC]

## (undated) DEVICE — ELECTRODE PT RET AD L9FT HI MOIST COND ADH HYDRGEL CORDED